# Patient Record
Sex: MALE | Race: WHITE | NOT HISPANIC OR LATINO | Employment: FULL TIME | URBAN - METROPOLITAN AREA
[De-identification: names, ages, dates, MRNs, and addresses within clinical notes are randomized per-mention and may not be internally consistent; named-entity substitution may affect disease eponyms.]

---

## 2017-03-09 ENCOUNTER — TELEPHONE (OUTPATIENT)
Dept: NURSING | Facility: CLINIC | Age: 47
End: 2017-03-09

## 2017-03-09 NOTE — TELEPHONE ENCOUNTER
"Call Type: Triage Call    Presenting Problem: \"I need a doctor who can treat neuropathy.  I  can't stand it any more.\"  I can't even walk on my feet correctly  anymore.  I did see a podiatrist at one point but he wasn't helpful.  As he described his pain it also sounded like it could be vascular  in nature. He describes this as going on for 3 months.  He states he  has large vercose veins as well.  Together we decided he should  start with Internal Medicine for an evaluation so  if referral is  indicated he can get to the right specialist the first time.  He  agreed. Transferred to the schedulers for an appt.  Triage Note:  Guideline Title: Weakness or Paralysis ; Weakness or Paralysis  Recommended Disposition: See Provider within 72 Hours  Original Inclination: Wanted to speak with a nurse  Override Disposition:  Intended Action: See Dr/Make Appt  Physician Contacted: No  New or worsening numbness, weakness (dropping objects) or tingling of the  hand/fingers aggravated by repetitive use and relieved by shaking hand AND not  previously evaluated or not improving after 7 days of treatment ?  YES  Severe breathing problems ? NO  New or worsening signs and symptoms that may indicate shock ? NO  Choking sensation, cannot swallow own saliva with associated drooling and soft  muffled voice ? NO  Generalized weakness or paralysis after snake bite ? NO  Numbness in the groin and saddle area of pelvis AND lower extremity weakness OR  change in bowel or bladder control (loss of control, retention, overflow) ? NO  New difficulty swallowing ? NO  Chest discomfort associated with shortness of breath, sweating, odd heartbeats or  different heart rate, nausea, vomiting, lightheadedness, or fainting lasting 5 or  more minutes now or within the last hour ? NO  Chest pain spreading to the shoulders, neck, jaw, in one or both arms, stomach or  back lasting 5 or more minutes now or within the last hour. Pain is NOT  associated with " taking a deep breath or a productive cough, movement, or touch to  a localized area. ? NO  New muscle weakness after exposure to hot environment ? NO  New weakness AND new onset irregular or rapid more than 120 beats/minute heartbeat  ? NO  Muscle weakness that begins in feet or lower legs and continues to spread up the  body ? NO  Pressure, fullness, squeezing sensation or pain anywhere in the chest lasting 5 or  more minutes now or within the last hour. Pain is NOT associated with taking a  deep breath or a productive cough, movement, or touch to a localized area on the  chest. ? NO  New paralysis (unable to move) or weakness (not due to pain) involving both sides  of body below a specific level ? NO  Symptoms began after starting or changing dose of prescription, nonprescription,  alternative medication, or illicit drug within last 7 days ? NO  Traveled out of country in past 2 months and new onset of unexplained symptom(s) ?  NO  New numbness, weakness or paralysis involving face, arm or leg, especially on same  side of body, loss of balance or coordination, confusion or trouble speaking  occurring now or within last 8 hours ? NO  Experienced transient ischemic symptoms occurring more than 8 hours ago, but  within 24 hours, now resolved ? NO  New onset of severe generalized weakness developing over a few days or less ? NO  New onset or sudden worsening of altered mental status now or within last 8 hours  ? NO  New or sudden worsening difficulty speaking or swallowing occurring now or within  last 8 hours ? NO  New loss of balance or coordination (purposeful action) causing sudden trouble  walking or sitting upright occurring now or within last 8 hours ? NO  Physician Instructions:  Care Advice: Protect the patient from falling or other harm.  Another adult should drive.  Avoid repetitive movements or static body positions that worsen symptoms.  Call provider if symptoms worsen or new symptoms develop.  Follow  provider's prior recommendations for similar pain  if no relief, call provider.  Remove any rings on the fingers of the affected hand, if possible.  Tell provider if concerned about loss of muscle mass or decreased strength.  Symptom Relief:   - Take frequent breaks from activities that seem to cause  symptoms.  - Modify the wrist position when doing repetitive movements so  that the wrist is straight rather than bent down or extended back.  -  Stretch and bend the wrist during a break from activity. Reduce force of  movements  relax your .  - Maintain good posture.  - Use ergonomic tools and  keyboards.  - Reduce wrist bending.  - Keep the hands warm by warming up  the room or wearing fingerless gloves.  - Talk with a provider about  possible exercises to help strength muscles of hands and wrists.  Analgesic/Antipyretic Advice - NSAIDs: Consider aspirin, ibuprofen,  naproxen or ketoprofen for pain or fever as directed on label or by  pharmacist/provider. PRECAUTIONS: - You should not take this medicine for  more than 10 days unless recommended by your provider. EXCEPTIONS: - Should  not be used if taking blood thinners or have bleeding problems. - Do not  use if have history of sensitivity/allergy to any of these medications  or history of cardiovascular, ulcer, kidney, liver disease or diabetes  unless approved by provider. - Do not exceed recommended dose or frequency.  Analgesic/Antipyretic Advice - Acetaminophen: Consider acetaminophen as  directed on label or by pharmacist/provider for pain or fever. PRECAUTIONS:  - Use if there is no history of liver disease, alcoholism, or intake of  three or more alcohol drinks per day - Only if approved by provider during  pregnancy or when breastfeeding - Do not exceed recommended dose or  frequency. Do not take more than 3000 milligrams (mg) in 24 hours. Do not  take this medicine for more than 10 days unless recommended by your  provider. - During pregnancy,  acetaminophen should not be taken more than 3  consecutive days without telling provider - To make sure you don't take too  much, check other medicines you take to see if they also contain  acetaminophen.

## 2017-03-10 ENCOUNTER — OFFICE VISIT (OUTPATIENT)
Dept: FAMILY MEDICINE | Facility: CLINIC | Age: 47
End: 2017-03-10
Payer: COMMERCIAL

## 2017-03-10 VITALS
HEART RATE: 64 BPM | WEIGHT: 183 LBS | BODY MASS INDEX: 26.2 KG/M2 | TEMPERATURE: 98.7 F | DIASTOLIC BLOOD PRESSURE: 92 MMHG | SYSTOLIC BLOOD PRESSURE: 149 MMHG | HEIGHT: 70 IN

## 2017-03-10 DIAGNOSIS — G62.9 PERIPHERAL POLYNEUROPATHY: Primary | ICD-10-CM

## 2017-03-10 LAB
ALBUMIN SERPL-MCNC: 3.6 G/DL (ref 3.4–5)
ALP SERPL-CCNC: 73 U/L (ref 40–150)
ALT SERPL W P-5'-P-CCNC: 47 U/L (ref 0–70)
ANION GAP SERPL CALCULATED.3IONS-SCNC: 4 MMOL/L (ref 3–14)
AST SERPL W P-5'-P-CCNC: 42 U/L (ref 0–45)
BILIRUB SERPL-MCNC: 0.2 MG/DL (ref 0.2–1.3)
BUN SERPL-MCNC: 5 MG/DL (ref 7–30)
CALCIUM SERPL-MCNC: 7.6 MG/DL (ref 8.5–10.1)
CHLORIDE SERPL-SCNC: 107 MMOL/L (ref 94–109)
CO2 SERPL-SCNC: 30 MMOL/L (ref 20–32)
CREAT SERPL-MCNC: 0.73 MG/DL (ref 0.66–1.25)
CRP SERPL-MCNC: <2.9 MG/L (ref 0–8)
ERYTHROCYTE [DISTWIDTH] IN BLOOD BY AUTOMATED COUNT: 11.9 % (ref 10–15)
ERYTHROCYTE [SEDIMENTATION RATE] IN BLOOD BY WESTERGREN METHOD: 5 MM/H (ref 0–15)
GFR SERPL CREATININE-BSD FRML MDRD: ABNORMAL ML/MIN/1.7M2
GLUCOSE SERPL-MCNC: 82 MG/DL (ref 70–99)
HBA1C MFR BLD: 5.3 % (ref 4.3–6)
HCT VFR BLD AUTO: 39 % (ref 40–53)
HGB BLD-MCNC: 13.6 G/DL (ref 13.3–17.7)
MCH RBC QN AUTO: 33.3 PG (ref 26.5–33)
MCHC RBC AUTO-ENTMCNC: 34.9 G/DL (ref 31.5–36.5)
MCV RBC AUTO: 96 FL (ref 78–100)
PLATELET # BLD AUTO: 250 10E9/L (ref 150–450)
POTASSIUM SERPL-SCNC: 3.4 MMOL/L (ref 3.4–5.3)
PROT SERPL-MCNC: 6.6 G/DL (ref 6.8–8.8)
RBC # BLD AUTO: 4.08 10E12/L (ref 4.4–5.9)
SODIUM SERPL-SCNC: 141 MMOL/L (ref 133–144)
TSH SERPL DL<=0.005 MIU/L-ACNC: 0.61 MU/L (ref 0.4–4)
VIT B12 SERPL-MCNC: 446 PG/ML (ref 193–986)
WBC # BLD AUTO: 9 10E9/L (ref 4–11)

## 2017-03-10 PROCEDURE — 82607 VITAMIN B-12: CPT | Performed by: INTERNAL MEDICINE

## 2017-03-10 PROCEDURE — 99214 OFFICE O/P EST MOD 30 MIN: CPT | Performed by: INTERNAL MEDICINE

## 2017-03-10 PROCEDURE — 83036 HEMOGLOBIN GLYCOSYLATED A1C: CPT | Performed by: INTERNAL MEDICINE

## 2017-03-10 PROCEDURE — 99000 SPECIMEN HANDLING OFFICE-LAB: CPT | Performed by: INTERNAL MEDICINE

## 2017-03-10 PROCEDURE — 84443 ASSAY THYROID STIM HORMONE: CPT | Performed by: INTERNAL MEDICINE

## 2017-03-10 PROCEDURE — 85652 RBC SED RATE AUTOMATED: CPT | Performed by: INTERNAL MEDICINE

## 2017-03-10 PROCEDURE — 86140 C-REACTIVE PROTEIN: CPT | Performed by: INTERNAL MEDICINE

## 2017-03-10 PROCEDURE — 36415 COLL VENOUS BLD VENIPUNCTURE: CPT | Performed by: INTERNAL MEDICINE

## 2017-03-10 PROCEDURE — 84207 ASSAY OF VITAMIN B-6: CPT | Mod: 90 | Performed by: INTERNAL MEDICINE

## 2017-03-10 PROCEDURE — 85027 COMPLETE CBC AUTOMATED: CPT | Performed by: INTERNAL MEDICINE

## 2017-03-10 PROCEDURE — 84425 ASSAY OF VITAMIN B-1: CPT | Mod: 90 | Performed by: INTERNAL MEDICINE

## 2017-03-10 PROCEDURE — 80053 COMPREHEN METABOLIC PANEL: CPT | Performed by: INTERNAL MEDICINE

## 2017-03-10 RX ORDER — GABAPENTIN 300 MG/1
300 CAPSULE ORAL 3 TIMES DAILY
Qty: 90 CAPSULE | Refills: 3 | Status: SHIPPED | OUTPATIENT
Start: 2017-03-10 | End: 2017-06-23

## 2017-03-10 NOTE — PATIENT INSTRUCTIONS
We will check some labs and try the gabapentin for nerve pain.  If labs are normal, we may consider a referral to neurology.  If the gabapentin doesn't work, let us know.

## 2017-03-10 NOTE — PROGRESS NOTES
SUBJECTIVE:                                                    Santino Adame is a 46 year old male who presents to clinic today for the following health issues:      Joint Pain     Onset: 3 years    Description:   Location: bilateral feet  Character: Sharp, Dull ache and Cramping    Intensity: moderate, severe    Progression of Symptoms: worse    Accompanying Signs & Symptoms:  Other symptoms: numbness and tingling   History:   Previous similar pain: no       Precipitating factors:   Trauma or overuse: YES- possibly stands a lot with work    Alleviating factors:  Improved by: rest/inactivity and aspirin       Therapies Tried and outcome: aspirin helps temporarily      Santino presents with 3 years of worsening bilateral foot pain. He says that this pain is located in toes and in the bottoms of both feet. Pain is present constantly. He describes the pain as burning/aching/numbness. He says the numbness feels like his toes are elevated an inch above the ground even when he is touching the ground. He notes that his feet do get red on the bottom. He does not having cramping with walking. The pain gets worse with long periods of standing. He does note that the veins in his feet are rather prominent. He has been feeling rather fatigued. He has had a history of abdominal cramps and diarrhea for the past years. He states that he did see a podiatrist at the onset of this pain and was diagnosed with metatarsalgia. He does not have any history of diabetes, thyroid problems, HIV. He drinks about 2 beers per day.    Problem list and histories reviewed & adjusted, as indicated.  Additional history: as documented    Current Outpatient Prescriptions   Medication Sig Dispense Refill     gabapentin (NEURONTIN) 300 MG capsule Take 1 capsule (300 mg) by mouth 3 times daily 90 capsule 3     doxycycline (VIBRAMYCIN) 100 MG capsule Take 1 capsule (100 mg) by mouth 2 times daily (with meals) 120 capsule 1     multivitamin, therapeutic with  "minerals (MULTI-VITAMIN) TABS Take 1 tablet by mouth daily Reported on 3/10/2017 100 tablet 3     omeprazole (PRILOSEC) 20 MG capsule Take 20 mg by mouth daily Reported on 3/10/2017       predniSONE (DELTASONE) 10 MG tablet Take 30 mg daily for 5 days, 20 mg daily for 5 days, then 10 mg daily for 5 days. (Patient not taking: Reported on 3/10/2017) 30 tablet 5     dexamethasone (DECADRON) 1 MG/ML solution Swish approximately 5 minutes and Spit one tsp of decadron two to three times daily. (Patient not taking: Reported on 3/10/2017) 100 mL 1     cholecalciferol 03271 UNITS capsule Take 1 capsule by mouth once a week (Patient not taking: Reported on 3/10/2017) 8 capsule 0     indomethacin (INDOCIN) 25 MG capsule Take 1 capsule (25 mg) by mouth 3 times daily (with meals) (Patient not taking: Reported on 3/10/2017) 42 capsule 1     calcium carbonate-vitamin D (CALTRATE 600+D) 600-400 MG-UNIT CHEW Take 1 chew tab by mouth 2 times daily (Patient not taking: Reported on 3/10/2017) 180 tablet 3     Allergies   Allergen Reactions     Amoxicillin        Reviewed and updated as needed this visit by clinical staff  Tobacco  Allergies  Meds  Med Hx  Surg Hx  Fam Hx  Soc Hx      Reviewed and updated as needed this visit by Provider         ROS:  Constitutional, MSK systems are negative, except as otherwise noted.    OBJECTIVE:                                                    BP (!) 149/92 (BP Location: Right arm, Cuff Size: Adult Large)  Pulse 64  Temp 98.7  F (37.1  C) (Tympanic)  Ht 5' 10\" (1.778 m)  Wt 183 lb (83 kg)  BMI 26.26 kg/m2  Body mass index is 26.26 kg/(m^2).  GENERAL: healthy, alert and no distress  RESP: lungs clear to auscultation - no rales, rhonchi or wheezes  CV: regular rate and rhythm, normal S1 S2, no S3 or S4, no murmur, click or rub, no peripheral edema   Foot exam: Both feet are reddened especially the toes, he does have prominent veins in both feet. PT pulses are somewhat reduced but DP pulses are " strong. He has decreased sensation on the ball of both feet with monofilament exam    Diagnostic Test Results:  Results for orders placed or performed in visit on 03/10/17 (from the past 24 hour(s))   TSH with free T4 reflex   Result Value Ref Range    TSH 0.61 0.40 - 4.00 mU/L   Hemoglobin A1c   Result Value Ref Range    Hemoglobin A1C 5.3 4.3 - 6.0 %   Comprehensive metabolic panel   Result Value Ref Range    Sodium 141 133 - 144 mmol/L    Potassium 3.4 3.4 - 5.3 mmol/L    Chloride 107 94 - 109 mmol/L    Carbon Dioxide 30 20 - 32 mmol/L    Anion Gap 4 3 - 14 mmol/L    Glucose 82 70 - 99 mg/dL    Urea Nitrogen 5 (L) 7 - 30 mg/dL    Creatinine 0.73 0.66 - 1.25 mg/dL    GFR Estimate >90  Non  GFR Calc   >60 mL/min/1.7m2    GFR Estimate If Black >90   GFR Calc   >60 mL/min/1.7m2    Calcium 7.6 (L) 8.5 - 10.1 mg/dL    Bilirubin Total 0.2 0.2 - 1.3 mg/dL    Albumin 3.6 3.4 - 5.0 g/dL    Protein Total 6.6 (L) 6.8 - 8.8 g/dL    Alkaline Phosphatase 73 40 - 150 U/L    ALT 47 0 - 70 U/L    AST 42 0 - 45 U/L   Erythrocyte sedimentation rate auto   Result Value Ref Range    Sed Rate 5 0 - 15 mm/h   CRP inflammation   Result Value Ref Range    CRP Inflammation <2.9 0.0 - 8.0 mg/L   CBC with platelets   Result Value Ref Range    WBC 9.0 4.0 - 11.0 10e9/L    RBC Count 4.08 (L) 4.4 - 5.9 10e12/L    Hemoglobin 13.6 13.3 - 17.7 g/dL    Hematocrit 39.0 (L) 40.0 - 53.0 %    MCV 96 78 - 100 fl    MCH 33.3 (H) 26.5 - 33.0 pg    MCHC 34.9 31.5 - 36.5 g/dL    RDW 11.9 10.0 - 15.0 %    Platelet Count 250 150 - 450 10e9/L        ASSESSMENT/PLAN:                                                        1. Peripheral polyneuropathy (H)    Symptoms sound consistent with peripheral neuropathy. He does not describe any claudication that would make me concerned about a vascular cause of his problems. We'll do workup as listed below for causes of neuropathy- labs so far are normal. He does drink 2 beers per day, I  feel like less than socially uses higher than he is reporting that this would not contribute to neuropathy. If labs are normal, consider nerve conduction study and/or referral to neurology. We're going to try some gabapentin and see if that improves his pain. If no improvement consider Lyrica.    - TSH with free T4 reflex  - Hemoglobin A1c  - Vitamin B12  - Vitamin B1 whole blood  - Vitamin B6  - Comprehensive metabolic panel  - Erythrocyte sedimentation rate auto  - CRP inflammation  - gabapentin (NEURONTIN) 300 MG capsule; Take 1 capsule (300 mg) by mouth 3 times daily  Dispense: 90 capsule; Refill: 3  - CBC with platelets      Naren Sullivan MD  [facility]

## 2017-03-10 NOTE — NURSING NOTE
"Chief Complaint   Patient presents with     Foot Problems     bilateral       Initial BP (!) 149/92 (BP Location: Right arm, Cuff Size: Adult Large)  Pulse 64  Temp 98.7  F (37.1  C) (Tympanic)  Ht 5' 10\" (1.778 m)  Wt 183 lb (83 kg)  BMI 26.26 kg/m2 Estimated body mass index is 26.26 kg/(m^2) as calculated from the following:    Height as of this encounter: 5' 10\" (1.778 m).    Weight as of this encounter: 183 lb (83 kg).  Medication Reconciliation: complete   Marya Jaimes / Certified Medical Assistant......3/10/2017 12:45 PM    "

## 2017-03-10 NOTE — MR AVS SNAPSHOT
"              After Visit Summary   3/10/2017    Santino Adame    MRN: [id number]           Patient Information     Date Of Birth          1970        Visit Information        Provider Department      3/10/2017 12:40 PM Naren Sullivan MD [facility]        Today's Diagnoses     Peripheral polyneuropathy (H)    -  1      Care Instructions    We will check some labs and try the gabapentin for nerve pain.  If labs are normal, we may consider a referral to neurology.  If the gabapentin doesn't work, let us know.        Follow-ups after your visit        Who to contact     If you have questions or need follow up information about today's clinic visit or your schedule please contact [facility] directly at [phone].  Normal or non-critical lab and imaging results will be communicated to you by MyChart, letter or phone within 4 business days after the clinic has received the results. If you do not hear from us within 7 days, please contact the clinic through [employer] or phone. If you have a critical or abnormal lab result, we will notify you by phone as soon as possible.  Submit refill requests through [employer] or call your pharmacy and they will forward the refill request to us. Please allow 3 business days for your refill to be completed.          Additional Information About Your Visit        [employer] Information     [employer] lets you send messages to your doctor, view your test results, renew your prescriptions, schedule appointments and more. To sign up, go to [email or web address] . Click on \"Log in\" on the left side of the screen, which will take you to the Welcome page. Then click on \"Sign up Now\" on the right side of the page.     You will be asked to enter the access code listed below, as well as some personal information. Please follow the directions to create your username and password.     Your access code is: [email or web address]  Expires: 6/8/2017  1:12 PM     Your access code will " " in 90 days. If you need help or a new code, please call your [facility] clinic or [phone].        Care EveryWhere ID     This is your Care EveryWhere ID. This could be used by other organizations to access your [facility] medical records  [id number]        Your Vitals Were     Pulse Temperature Height BMI (Body Mass Index)          64 98.7  F (37.1  C) (Tympanic) 5' 10\" (1.778 m) 26.26 kg/m2         Blood Pressure from Last 3 Encounters:   03/10/17 (!) 149/92   14 157/87   14 132/71    Weight from Last 3 Encounters:   03/10/17 183 lb (83 kg)   14 180 lb (81.6 kg)   14 185 lb (83.9 kg)              We Performed the Following     Basic metabolic panel     Comprehensive metabolic panel     CRP inflammation     Erythrocyte sedimentation rate auto     Hemoglobin A1c     TSH with free T4 reflex     Vitamin B1 whole blood     Vitamin B12     Vitamin B6          Today's Medication Changes          These changes are accurate as of: 3/10/17  1:12 PM.  If you have any questions, ask your nurse or doctor.               Start taking these medicines.        Dose/Directions    gabapentin 300 MG capsule   Commonly known as:  NEURONTIN   Used for:  Peripheral polyneuropathy (H)   Started by:  Naren Sullivan MD        Dose:  300 mg   Take 1 capsule (300 mg) by mouth 3 times daily   Quantity:  90 capsule   Refills:  3            Where to get your medicines      These medications were sent to [facility] [place], [place] - [address]  [address] [place] [address]     Phone:  [phone]     gabapentin 300 MG capsule                Primary Care Provider Office Phone # Fax #    Lizzy Mcqueen [phone]       [facility] [address] [address]        Thank you!     Thank you for choosing [facility]  for your care. Our goal is always to provide you with excellent care. Hearing back from our patients is one way we " can continue to improve our services. Please take a few minutes to complete the written survey that you may receive in the mail after your visit with us. Thank you!             Your Updated Medication List - Protect others around you: Learn how to safely use, store and throw away your medicines at www.disposemymeds.org.          This list is accurate as of: 3/10/17  1:12 PM.  Always use your most recent med list.                   Brand Name Dispense Instructions for use    calcium carbonate-vitamin D 600-400 MG-UNIT Chew    CALTRATE 600+D    180 tablet    Take 1 chew tab by mouth 2 times daily       cholecalciferol 14016 UNITS capsule    VITAMIN D3    8 capsule    Take 1 capsule by mouth once a week       dexamethasone 1 MG/ML (HIGH CONC) solution    DECADRON    100 mL    Swish approximately 5 minutes and Spit one tsp of decadron two to three times daily.       doxycycline 100 MG capsule    VIBRAMYCIN    120 capsule    Take 1 capsule (100 mg) by mouth 2 times daily (with meals)       gabapentin 300 MG capsule    NEURONTIN    90 capsule    Take 1 capsule (300 mg) by mouth 3 times daily       indomethacin 25 MG capsule    INDOCIN    42 capsule    Take 1 capsule (25 mg) by mouth 3 times daily (with meals)       Multi-vitamin Tabs tablet     100 tablet    Take 1 tablet by mouth daily Reported on 3/10/2017       predniSONE 10 MG tablet    DELTASONE    30 tablet    Take 30 mg daily for 5 days, 20 mg daily for 5 days, then 10 mg daily for 5 days.       priLOSEC 20 MG CR capsule   Generic drug:  omeprazole      Take 20 mg by mouth daily Reported on 3/10/2017

## 2017-03-12 LAB — VIT B6 SERPL-MCNC: 30 NG/ML

## 2017-03-13 LAB — VIT B1 BLD-MCNC: 40 UG/DL

## 2017-06-23 ENCOUNTER — OFFICE VISIT (OUTPATIENT)
Dept: FAMILY MEDICINE | Facility: CLINIC | Age: 47
End: 2017-06-23
Payer: COMMERCIAL

## 2017-06-23 VITALS
BODY MASS INDEX: 26.34 KG/M2 | TEMPERATURE: 98.2 F | SYSTOLIC BLOOD PRESSURE: 129 MMHG | WEIGHT: 184 LBS | OXYGEN SATURATION: 99 % | DIASTOLIC BLOOD PRESSURE: 93 MMHG | HEART RATE: 76 BPM | HEIGHT: 70 IN

## 2017-06-23 DIAGNOSIS — G62.9 PERIPHERAL POLYNEUROPATHY: Primary | ICD-10-CM

## 2017-06-23 DIAGNOSIS — M1A.0720 CHRONIC IDIOPATHIC GOUT INVOLVING TOE OF LEFT FOOT WITHOUT TOPHUS: ICD-10-CM

## 2017-06-23 DIAGNOSIS — I10 BENIGN ESSENTIAL HYPERTENSION: ICD-10-CM

## 2017-06-23 PROCEDURE — 99214 OFFICE O/P EST MOD 30 MIN: CPT | Performed by: INTERNAL MEDICINE

## 2017-06-23 RX ORDER — LISINOPRIL 10 MG/1
10 TABLET ORAL DAILY
Qty: 30 TABLET | Refills: 3 | Status: SHIPPED | OUTPATIENT
Start: 2017-06-23 | End: 2018-05-18

## 2017-06-23 RX ORDER — ALLOPURINOL 100 MG/1
TABLET ORAL
Qty: 90 TABLET | Refills: 0 | Status: SHIPPED | OUTPATIENT
Start: 2017-06-23 | End: 2017-12-27

## 2017-06-23 RX ORDER — ALLOPURINOL 300 MG/1
300 TABLET ORAL DAILY
Qty: 30 TABLET | Refills: 11 | Status: SHIPPED | OUTPATIENT
Start: 2017-06-23 | End: 2017-12-27

## 2017-06-23 RX ORDER — GABAPENTIN 400 MG/1
CAPSULE ORAL
Qty: 120 CAPSULE | Refills: 11 | Status: SHIPPED | OUTPATIENT
Start: 2017-06-23 | End: 2017-09-01

## 2017-06-23 NOTE — NURSING NOTE
"Chief Complaint   Patient presents with     NEUROPATHY     few years     Arthritis     x 7 years, last episode about 1.5 weeks ago       Initial BP (!) 138/94 (BP Location: Right arm, Patient Position: Chair, Cuff Size: Adult Regular)  Pulse 76  Temp 98.2  F (36.8  C) (Tympanic)  Ht 5' 10\" (1.778 m)  Wt 184 lb (83.5 kg)  SpO2 99%  BMI 26.4 kg/m2 Estimated body mass index is 26.4 kg/(m^2) as calculated from the following:    Height as of this encounter: 5' 10\" (1.778 m).    Weight as of this encounter: 184 lb (83.5 kg).  Medication Reconciliation: complete   Yanira LITTLE CMA (AAMA)    "

## 2017-06-23 NOTE — PATIENT INSTRUCTIONS
Return in 2 weeks after starting the lisinopril for blood pressure for a free nurse blood pressure check and have your blood work drawn at the same time.  We will also check the B1 level at that point.    Start the allopurinol to help prevent gout.        Controlling High Blood Pressure  High blood pressure (hypertension) is often called the silent killer. This is because many people who have it don t know it. High blood pressure is defined as 140/90 mm Hg or higher. Know your blood pressure and remember to check it regularly. Doing so can save your life. Here are some things you can do to help control your blood pressure.    Choose heart-healthy foods    Select low-salt, low-fat foods. Limit sodium intake to 2,400 mg per day or the amount suggested by your healthcare provider.    Limit canned, dried, cured, packaged, and fast foods. These can contain a lot of salt.    Eat 8 to 10 servings of fruits and vegetables every day.    Choose lean meats, fish, or chicken.    Eat whole-grain pasta, brown rice, and beans.    Eat 2 to 3 servings of low-fat or fat-free dairy products.    Ask your doctor about the DASH eating plan. This plan helps reduce blood pressure.    When you go to a restaurant, ask that your meal be prepared with no added salt.  Maintain a healthy weight    Ask your healthcare provider how many calories to eat a day. Then stick to that number.    Ask your healthcare provider what weight range is healthiest for you. If you are overweight, a weight loss of only 3% to 5% of your body weight can help lower blood pressure. Generally, a good weight loss goal is to lose 10% of your body weight in a year.    Limit snacks and sweets.    Get regular exercise.  Get up and get active    Choose activities you enjoy. Find ones you can do with friends or family. This includes bicycling, dancing, walking, and jogging.    Park farther away from building entrances.    Use stairs instead of the elevator.    When you can,  walk or bike instead of driving.    [place] leaves, garden, or do household repairs.    Be active at a moderate to vigorous level of physical activity for at least 40 minutes for a minimum of 3 to 4 days a week.   Manage stress    Make time to relax and enjoy life. Find time to laugh.    Communicate your concerns with your loved ones and your healthcare provider.    Visit with family and friends, and keep up with hobbies.  Limit alcohol and quit smoking    Men should have no more than 2 drinks per day.    Women should have no more than 1 drink per day.    Talk with your healthcare provider about quitting smoking. Smoking significantly increases your risk for heart disease and stroke. Ask your healthcare provider about community smoking cessation programs and other options.  Medicines  If lifestyle changes aren t enough, your healthcare provider may prescribe high blood pressure medicine. Take all medicines as prescribed. If you have any questions about your medicines, ask your healthcare provider before stopping or changing them.   Date Last Reviewed: 4/27/2016 2000-2017 [employer]. [address] [place] [address], [place] [address]. All rights reserved. This information is not intended as a substitute for professional medical care. Always follow your healthcare professional's instructions.

## 2017-06-23 NOTE — MR AVS SNAPSHOT
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  We will also check the B1 level at that point.    Start the allopurinol to help prevent gout.        Controlling High Blood Pressure  High blood pressure (hypertension) is often called the silent killer. This is because many people who have it don t know it. High blood pressure is defined as 140/90 mm Hg or higher. Know your blood pressure and remember to check it regularly. Doing so can save your life. Here are some things you can do to help control your blood pressure.    Choose heart-healthy foods    Select low-salt, low-fat foods. Limit sodium intake to 2,400 mg per day or the amount suggested by your healthcare provider.    Limit canned, dried, cured, packaged, and fast foods. These can contain a lot of salt.    Eat 8 to 10 servings of fruits and vegetables every day.    Choose lean meats, fish, or chicken.    Eat whole-grain pasta, brown rice, and beans.    Eat 2 to 3 servings of low-fat or fat-free dairy products.    Ask your doctor about the DASH eating plan. This plan helps reduce blood pressure.    When you go to a restaurant, ask that your meal be prepared with no added salt.  Maintain a healthy weight    Ask your healthcare provider how many calories to eat a day. Then stick to that number.    Ask your healthcare provider what weight range is healthiest for you. If you are overweight, a  weight loss of only 3% to 5% of your body weight can help lower blood pressure. Generally, a good weight loss goal is to lose 10% of your body weight in a year.    Limit snacks and sweets.    Get regular exercise.  Get up and get active    Choose activities you enjoy. Find ones you can do with friends or family. This includes bicycling, dancing, walking, and jogging.    Park farther away from building entrances.    Use stairs instead of the elevator.    When you can, walk or bike instead of driving.    [place] leaves, garden, or do household repairs.    Be active at a moderate to vigorous level of physical activity for at least 40 minutes for a minimum of 3 to 4 days a week.   Manage stress    Make time to relax and enjoy life. Find time to laugh.    Communicate your concerns with your loved ones and your healthcare provider.    Visit with family and friends, and keep up with hobbies.  Limit alcohol and quit smoking    Men should have no more than 2 drinks per day.    Women should have no more than 1 drink per day.    Talk with your healthcare provider about quitting smoking. Smoking significantly increases your risk for heart disease and stroke. Ask your healthcare provider about community smoking cessation programs and other options.  Medicines  If lifestyle changes aren t enough, your healthcare provider may prescribe high blood pressure medicine. Take all medicines as prescribed. If you have any questions about your medicines, ask your healthcare provider before stopping or changing them.   Date Last Reviewed: 4/27/2016 2000-2017 [employer]. [address] [place] [address], [place] [address]. All rights reserved. This information is not intended as a substitute for professional medical care. Always follow your healthcare professional's instructions.                Follow-ups after your visit        Future tests that were ordered for you today     Open Future Orders        Priority Expected Expires Ordered  "   **Basic metabolic panel FUTURE 14d Routine 2017    Vitamin B1 whole blood Routine  2018            Who to contact     If you have questions or need follow up information about today's clinic visit or your schedule please contact [facility] directly at [phone].  Normal or non-critical lab and imaging results will be communicated to you by MyChart, letter or phone within 4 business days after the clinic has received the results. If you do not hear from us within 7 days, please contact the clinic through [employer] or phone. If you have a critical or abnormal lab result, we will notify you by phone as soon as possible.  Submit refill requests through [employer] or call your pharmacy and they will forward the refill request to us. Please allow 3 business days for your refill to be completed.          Additional Information About Your Visit        [employer] Information     [employer] lets you send messages to your doctor, view your test results, renew your prescriptions, schedule appointments and more. To sign up, go to [email or web address] . Click on \"Log in\" on the left side of the screen, which will take you to the Welcome page. Then click on \"Sign up Now\" on the right side of the page.     You will be asked to enter the access code listed below, as well as some personal information. Please follow the directions to create your username and password.     Your access code is: [email or web address]  Expires: 2017  1:25 PM     Your access code will  in 90 days. If you need help or a new code, please call your [facility] clinic or [phone].        Care EveryWhere ID     This is your Care EveryWhere ID. This could be used by other organizations to access your [facility] medical records  [id number]        Your Vitals Were     Pulse Temperature Height Pulse Oximetry BMI (Body Mass Index)       76 98.2  F (36.8  C) (Tympanic) 5' 10\" (1.778 m) 99% 26.4 kg/m2        Blood " Pressure from Last 3 Encounters:   06/23/17 (!) 129/93   03/10/17 (!) 149/92   04/11/14 157/87    Weight from Last 3 Encounters:   06/23/17 184 lb (83.5 kg)   03/10/17 183 lb (83 kg)   05/09/14 180 lb (81.6 kg)                 Today's Medication Changes          These changes are accurate as of: 6/23/17  1:25 PM.  If you have any questions, ask your nurse or doctor.               Start taking these medicines.        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 Read the directions carefully, and ask your doctor or other care provider to review them with you.      These medicines have changed or have updated prescriptions.        Dose/Directions    gabapentin 400 MG capsule   Commonly known as:  NEURONTIN   This may have changed:    - medication strength  - how much to take  - how to take this  - when to take this  - additional instructions   Used for:  Peripheral polyneuropathy (H)   Changed by:  Naren Sullivan MD        Take 1 capsule 3 to 4 times per day   Quantity:  120 capsule   Refills:  11            Where to get your medicines       These medications were sent to [facility] [place], [place] - [address] [facility]  [address] [address]     Phone:  [phone]     allopurinol 100 MG tablet    allopurinol 300 MG tablet    gabapentin 400 MG capsule    lisinopril 10 MG tablet                Primary Care Provider Office Phone # Fax #    Lizzy Mcqueen [phone]       [facility] [address] [facility] [address]        Equal Access to Services     SCOTT REA : Hadii aad ku hadasho Soomaali, waaxda luqadaha, qaybta kaalmada adeegyada, waxay idiin hayaan adeeg kharash durga . So [facility] [phone].    ATENCIÓN: Si habla español, tiene a cai disposición servicios gratuitos de asistencia lingüística. [facility] [phone].    We comply with applicable federal civil rights laws and [place] laws. We do not discriminate on the basis of race, color, national origin, age, disability sex, sexual orientation or gender identity.            Thank you!     Thank you for choosing [facility]  for your care. Our goal is always to provide you with excellent care. Hearing back from our patients is one way we can continue to improve our services. Please take a few minutes to complete the written survey that you may receive in the mail after your visit with us. Thank you!             Your Updated Medication List - Protect others around you: Learn how to safely use, store and throw away your medicines at www.disposemymeds.org.          This list is accurate as of: 6/23/17  1:25 PM.  Always use your most recent med list.                   Brand Name Dispense Instructions for use Diagnosis    * allopurinol 100 MG tablet    ZYLOPRIM    90 tablet    Take 100mg per day for one week, then 200mg per day for one week, then 300mg per day.    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 tophus       * allopurinol 300 MG tablet    ZYLOPRIM    30 tablet    Take 1 tablet (300 mg) by  mouth daily    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 tophus       calcium carbonate-vitamin D 600-400 MG-UNIT Chew    CALTRATE 600+D    180 tablet    Take 1 chew tab by mouth 2 times daily    Mouth ulcers       cholecalciferol 99934 UNITS capsule    VITAMIN D3    8 capsule    Take 1 capsule by mouth once a week    Unspecified vitamin D deficiency       dexamethasone 1 MG/ML (HIGH CONC) solution    DECADRON    100 mL    Swish approximately 5 minutes and Spit one tsp of decadron two to three times daily.    Aphthae, oral, Oral aphthae, Aphthous ulcer       gabapentin 400 MG capsule    NEURONTIN    120 capsule    Take 1 capsule 3 to 4 times per day    Peripheral polyneuropathy (H)       indomethacin 25 MG capsule    INDOCIN    42 capsule    Take 1 capsule (25 mg) by mouth 3 times daily (with meals)    Gout, unspecified       lisinopril 10 MG tablet    PRINIVIL/ZESTRIL    30 tablet    Take 1 tablet (10 mg) by mouth daily    Benign essential hypertension       Multi-vitamin Tabs tablet     100 tablet    Take 1 tablet by mouth daily Reported on 3/10/2017        priLOSEC 20 MG CR capsule   Generic drug:  omeprazole      Take 20 mg by mouth daily Reported on 3/10/2017        VITAMIN B-1 PO           * Notice:  This list has 2 medication(s) that are the same as other medications prescribed for you. Read the directions carefully, and ask your doctor or other care provider to review them with you.

## 2017-06-23 NOTE — PROGRESS NOTES
SUBJECTIVE:                                                    Santino Adame is a 47 year old male who presents to clinic today for the following health issues:  Chief Complaint   Patient presents with     NEUROPATHY     few years     Arthritis     x 7 years, last episode about 1.5 weeks ago       Concern - Neuropathy   Onset: few years     Description:   Bilateral foot nerve pain, will radiate into ankle a bit.      Intensity: moderate, severe    Progression of Symptoms:  improving and waxing and waning    Accompanying Signs & Symptoms:  None     Previous history of similar problem:   On going    Precipitating factors:   Worsened by: on feet all day     Alleviating factors:  Improved by: sitting down, elevation     Therapies Tried and outcome: gabapentin is helping; however, patient is taking Gabapentin 4 - 5 times daily when at work and wondering if he needs a dosage change.     I saw Santino in March for bilateral foot pain that sounded neuropathic.  His B1 level was a bit low and he does drink a few beers daily.  He has normal TSH, A1C, CMP, ESR, CRP, CBC, B12, and B6.  We started gabapentin, which is helping some but he wonders if dose could be higher.  He is not having any side effects.       Gout  Duration: 7 years, having flares 4-5 times per year   Description   Location: big toe - left  Joint Swelling: YES, swelling to ankle   Redness: YES  Pain intensity:  severe  Accompanying signs and symptoms: very tough to walk   History  Previous history of gout: YES   Trauma to the area: no   Precipitating factors:   Alcohol usage: Moderate  Diuretic use: no   Recent illness: no   Therapies tried and outcome: ibuprofen seems to help, indocin   Patient looking for a preventative medication for gout.     Had recent gout flare last week in the left 1st toe.  He has had it in the left ankle as well and also the right foot.  Would like to start preventative medication.      He is also concerned that BPs have been a bit  high.        Problem list and histories reviewed & adjusted, as indicated.  Additional history: as documented    Current Outpatient Prescriptions   Medication Sig Dispense Refill     Thiamine HCl (VITAMIN B-1 PO)        gabapentin (NEURONTIN) 400 MG capsule Take 1 capsule 3 to 4 times per day 120 capsule 11     allopurinol (ZYLOPRIM) 100 MG tablet Take 100mg per day for one week, then 200mg per day for one week, then 300mg per day. 90 tablet 0     allopurinol (ZYLOPRIM) 300 MG tablet Take 1 tablet (300 mg) by mouth daily 30 tablet 11     lisinopril (PRINIVIL/ZESTRIL) 10 MG tablet Take 1 tablet (10 mg) by mouth daily 30 tablet 3     multivitamin, therapeutic with minerals (MULTI-VITAMIN) TABS Take 1 tablet by mouth daily Reported on 3/10/2017 100 tablet 3     omeprazole (PRILOSEC) 20 MG capsule Take 20 mg by mouth daily Reported on 3/10/2017       [DISCONTINUED] gabapentin (NEURONTIN) 300 MG capsule Take 1 capsule (300 mg) by mouth 3 times daily (Patient taking differently: Take 300 mg by mouth 4 times daily ) 90 capsule 3     dexamethasone (DECADRON) 1 MG/ML solution Swish approximately 5 minutes and Spit one tsp of decadron two to three times daily. (Patient not taking: Reported on 3/10/2017) 100 mL 1     cholecalciferol 28046 UNITS capsule Take 1 capsule by mouth once a week (Patient not taking: Reported on 3/10/2017) 8 capsule 0     indomethacin (INDOCIN) 25 MG capsule Take 1 capsule (25 mg) by mouth 3 times daily (with meals) (Patient not taking: Reported on 3/10/2017) 42 capsule 1     calcium carbonate-vitamin D (CALTRATE 600+D) 600-400 MG-UNIT CHEW Take 1 chew tab by mouth 2 times daily (Patient not taking: Reported on 3/10/2017) 180 tablet 3     Allergies   Allergen Reactions     Amoxicillin        Reviewed and updated as needed this visit by clinical staff  Tobacco  Allergies  Med Hx  Surg Hx  Fam Hx  Soc Hx      Reviewed and updated as needed this visit by Provider         ROS:  Constitutional,  "cardiovascular, MSK, neuro systems are negative, except as otherwise noted.    OBJECTIVE:     BP (!) 129/93 (BP Location: Right arm, Patient Position: Chair, Cuff Size: Adult Regular)  Pulse 76  Temp 98.2  F (36.8  C) (Tympanic)  Ht 5' 10\" (1.778 m)  Wt 184 lb (83.5 kg)  SpO2 99%  BMI 26.4 kg/m2  Body mass index is 26.4 kg/(m^2).  GENERAL: healthy, alert and no distress  MS: some ongoing swelling in ankles    Diagnostic Test Results:  none     ASSESSMENT/PLAN:       1. Peripheral polyneuropathy (H)    Possibly due to low B1 from alcohol use.  Gabapentin is helping though current dose not quite enough.  He is taking B1 supplement intermittently-recommended more consistent use and will recheck level in a couple weeks.  Recommended decreasing alcohol consumption, which may help with neuropathy, gout, and HTN.     - gabapentin (NEURONTIN) 400 MG capsule; Take 1 capsule 3 to 4 times per day  Dispense: 120 capsule; Refill: 11  - Vitamin B1 whole blood; Future    2. 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  Will titrate up to 300mg of allopurinol daily.  Will not recheck uric acid level since these have been normal in past.      - allopurinol (ZYLOPRIM) 100 MG tablet; Take 100mg per day for one week, then 200mg per day for one week, then 300mg per day.  Dispense: 90 tablet; Refill: 0  - allopurinol (ZYLOPRIM) 300 MG tablet; Take 1 tablet (300 mg) by mouth daily  Dispense: 30 tablet; Refill: 11    3. Benign essential hypertension    DBP slightly high though SBP normal.  Discussed lifestyle changes.  He would like to start medication now due to family history, so will initiate lisinopril.  RN BP check and labs in 2 weeks.      - lisinopril (PRINIVIL/ZESTRIL) 10 MG tablet; Take 1 tablet (10 mg) by mouth daily  Dispense: 30 tablet; Refill: 3  - **Basic metabolic panel FUTURE 14d; Future      Naren Sullivan MD  [facility] " [place]

## 2017-07-11 PROBLEM — I10 BENIGN ESSENTIAL HYPERTENSION: Status: ACTIVE | Noted: 2017-07-11

## 2017-07-19 ENCOUNTER — TELEPHONE (OUTPATIENT)
Dept: FAMILY MEDICINE | Facility: CLINIC | Age: 47
End: 2017-07-19

## 2017-07-19 NOTE — LETTER
[facility]  [address] [address]  Phone: [phone]    August 2, 2017      Santino Adame  [address] [address]            Dear Santino Adame:    Your most recent blood pressure reading preformed at our clinic was higher than we like to see it. The goal is to have it under 140/90 in clinic. Please call our clinic [phone] to schedule a blood pressure recheck on our RN schedule. This appointment is free of charge and takes about 15 minutes to complete. Be sure to take all of your blood pressure medications, and avoid stimulants like caffeine, cold medicines, sudafed, or tobacco prior to your recheck.    If your blood pressure medication was changed by your provider recently wait 2 weeks before making this recheck appointment.     Thank you for trusting us with your health care.    Sincerely,    SAUNDRA Paalcio / augustine

## 2017-07-19 NOTE — TELEPHONE ENCOUNTER
Called patient, left message w/ phone number for patient to return call. When patient calls back, please schedule w/ RN for a blood pressure check. Will route to panel management pool and postpone for 1 week. Yanira LITTLE CMA ([facility])

## 2017-08-02 NOTE — TELEPHONE ENCOUNTER
Panel Management Review      Patient has the following on his problem list:     Hypertension   Last three blood pressure readings:  BP Readings from Last 3 Encounters:   06/23/17 (!) 129/93   03/10/17 (!) 149/92   04/11/14 157/87     Blood pressure: FAILED    HTN Guidelines:  Age 18-59 BP range:  Less than 140/90  Age 60-85 with Diabetes:  Less than 140/90  Age 60-85 without Diabetes:  less than 150/90        Composite cancer screening  Chart review shows that this patient is due/due soon for the following None  Summary:    Patient is due/failing the following:   BP CHECK    Action needed:   Patient needs nurse only appointment.    Type of outreach:    Sent letter.    Questions for provider review:    None                                                                                                                                    Rick CROWDER CMA

## 2017-09-01 ENCOUNTER — ALLIED HEALTH/NURSE VISIT (OUTPATIENT)
Dept: FAMILY MEDICINE | Facility: CLINIC | Age: 47
End: 2017-09-01
Payer: COMMERCIAL

## 2017-09-01 ENCOUNTER — TELEPHONE (OUTPATIENT)
Dept: FAMILY MEDICINE | Facility: CLINIC | Age: 47
End: 2017-09-01

## 2017-09-01 VITALS — SYSTOLIC BLOOD PRESSURE: 134 MMHG | HEART RATE: 60 BPM | DIASTOLIC BLOOD PRESSURE: 89 MMHG

## 2017-09-01 DIAGNOSIS — G62.9 PERIPHERAL POLYNEUROPATHY: ICD-10-CM

## 2017-09-01 DIAGNOSIS — Z01.30 BP CHECK: Primary | ICD-10-CM

## 2017-09-01 PROCEDURE — 99207 ZZC NO CHARGE NURSE ONLY: CPT

## 2017-09-01 RX ORDER — GABAPENTIN 400 MG/1
CAPSULE ORAL
Qty: 150 CAPSULE | Refills: 11 | Status: SHIPPED | OUTPATIENT
Start: 2017-09-01 | End: 2018-05-18

## 2017-09-01 NOTE — TELEPHONE ENCOUNTER
Blood pressure is borderline, so okay to monitor or could try another medication.  In previous discussion, he preferred to take medication due to his family history, so if that is still the case, I would next recommending trying losartan.  I can send in an prescription if he would like.    I have updated his gabapentin prescription so that he can take it 5 times per day and the next fill will have more pills.  Thanks.    Naren Sullivan MD

## 2017-09-01 NOTE — TELEPHONE ENCOUNTER
Patient notified of MD's recommendations  He reports he will monitor BP for now and recheck BP in RN clinic in a month  Scheduled Rn Clinic appt for BP check 10/6/17    Routing to provider, for KEYONA CROWDER Rn

## 2017-09-01 NOTE — NURSING NOTE
Patient following up on Panel management for BP check  LOV 17, started Lisinopril at that time  Patient reports that he took Lisinopril as prescribed for 10 days, then stopped due to a nagging cough that started when he started taking Lisinopril  He has not had the nagging cough since discontinuing Lisinopril  He has not been back to complete Labs due to discontinuing Lisinopril    BP's today:  1st readin/91  2nd readin/89    Patient reports that he is taking 5 Gabapentin a day to control his pain, wanted provider to know that the 120 tablets are not holding him until the next refill is due to fill    Please advise    Routing to provider.    Raquel CROWDER Rn

## 2017-09-01 NOTE — MR AVS SNAPSHOT
"              After Visit Summary   2017    Santino Adame    MRN: [id number]           Patient Information     Date Of Birth          1970        Visit Information        Provider Department      2017 1:15 PM COLLETTE OGDINEZ/LINNETTE SALDAÑA [facility]        Today's Diagnoses     BP check    -  1       Follow-ups after your visit        Who to contact     If you have questions or need follow up information about today's clinic visit or your schedule please contact [facility] directly at [phone].  Normal or non-critical lab and imaging results will be communicated to you by MyChart, letter or phone within 4 business days after the clinic has received the results. If you do not hear from us within 7 days, please contact the clinic through [email or web address] or phone. If you have a critical or abnormal lab result, we will notify you by phone as soon as possible.  Submit refill requests through Avuxi or call your pharmacy and they will forward the refill request to us. Please allow 3 business days for your refill to be completed.          Additional Information About Your Visit        MyChart Information     Avuxi lets you send messages to your doctor, view your test results, renew your prescriptions, schedule appointments and more. To sign up, go to [email or web address] . Click on \"Log in\" on the left side of the screen, which will take you to the Welcome page. Then click on \"Sign up Now\" on the right side of the page.     You will be asked to enter the access code listed below, as well as some personal information. Please follow the directions to create your username and password.     Your access code is: [email or web address]  Expires: 2017  1:25 PM     Your access code will  in 90 days. If you need help or a new code, please call your [facility] or [phone].        Care EveryWhere ID     This is your Care EveryWhere ID. This could be used by other organizations to access your " [facility] medical records  [id number]        Your Vitals Were     Pulse                   60            Blood Pressure from Last 3 Encounters:   09/01/17 134/89   06/23/17 (!) 129/93   03/10/17 (!) 149/92    Weight from Last 3 Encounters:   06/23/17 184 lb (83.5 kg)   03/10/17 183 lb (83 kg)   05/09/14 180 lb (81.6 kg)              Today, you had the following     No orders found for display       Primary Care Provider Office Phone # Fax #    Lizzy Caraballozach, NICHOLE [phone] [address] [facility] [address]        Equal Access to Services     SCOTT REA : Hadii osorio avileso Soradha, waaxda luqadaha, qaybta kaalmada adeegyada, alicia thomason. So [facility] [phone].    ATENCIÓN: Si habla español, tiene a cai disposición servicios gratuitos de asistencia lingüística. Llame al [phone].    We comply with applicable federal civil rights laws and [place] laws. We do not discriminate on the basis of race, color, national origin, age, disability sex, sexual orientation or gender identity.            Thank you!     Thank you for choosing [facility]  for your care. Our goal is always to provide you with excellent care. Hearing back from our patients is one way we can continue to improve our services. Please take a few minutes to complete the written survey that you may receive in the mail after your visit with us. Thank you!             Your Updated Medication List - Protect others around you: Learn how to safely use, store and throw away your medicines at www.disposemymeds.org.          This list is accurate as of: 9/1/17  1:32 PM.  Always use your most recent med list.                   Brand Name Dispense Instructions for use Diagnosis    * allopurinol 100 MG tablet    ZYLOPRIM    90 tablet    Take 100mg per day for one week, then 200mg per day for one week, then 300mg per day.    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 tophus        * allopurinol 300 MG tablet    ZYLOPRIM    30 tablet    Take 1 tablet (300 mg) by mouth daily    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 tophus       calcium carbonate-vitamin D 600-400 MG-UNIT Chew    CALTRATE 600+D    180 tablet    Take 1 chew tab by mouth 2 times daily    Mouth ulcers       cholecalciferol 38435 UNITS capsule    VITAMIN D3    8 capsule    Take 1 capsule by mouth once a week    Unspecified vitamin D deficiency       dexamethasone 1 MG/ML (HIGH CONC) solution    DECADRON    100 mL    Swish approximately 5 minutes and Spit one tsp of decadron two to three times daily.    Aphthae, oral, Oral aphthae, Aphthous ulcer       gabapentin 400 MG capsule    NEURONTIN    120 capsule    Take 1 capsule 3 to 4 times per day    Peripheral polyneuropathy (H)       indomethacin 25 MG capsule    INDOCIN    42 capsule    Take 1 capsule (25 mg) by mouth 3 times daily (with meals)    Gout, unspecified       lisinopril 10 MG tablet    PRINIVIL/ZESTRIL    30 tablet    Take 1 tablet (10 mg) by mouth daily    Benign essential hypertension       Multi-vitamin Tabs tablet     100 tablet    Take 1 tablet by mouth daily Reported on 3/10/2017        priLOSEC 20 MG CR capsule   Generic drug:  omeprazole      Take 20 mg by mouth daily Reported on 3/10/2017        VITAMIN B-1 PO           * Notice:  This list has 2 medication(s) that are the same as other medications prescribed for you. Read the directions carefully, and ask your doctor or other care provider to review them with you.

## 2017-12-22 ENCOUNTER — TELEPHONE (OUTPATIENT)
Dept: FAMILY MEDICINE | Facility: CLINIC | Age: 47
End: 2017-12-22

## 2017-12-22 DIAGNOSIS — M1A.0720 CHRONIC IDIOPATHIC GOUT INVOLVING TOE OF LEFT FOOT WITHOUT TOPHUS: ICD-10-CM

## 2017-12-22 NOTE — TELEPHONE ENCOUNTER
Patient states he is having a gout flare.  He is not taking the allopurinol on a daily basis.  Requesting refill of indomethacin - last RX was 11/2013.  His LOV was back in June with Dr. Sullivan.  Rn advised appt.  He states he will go to .    Breanna CROWDER RN

## 2017-12-22 NOTE — TELEPHONE ENCOUNTER
Reason for call:  Patient reporting a symptom    Symptom or request: Gout    Duration (how long have symptoms been present): 2 days    Have you been treated for this before? Yes    Additional comments: Pt was treated with a regiment of medication last April. He was told it would clear gout for a year and he has had twice since. He is looking for a different medication today. He cannot get his boot on.     Phone Number patient can be reached at:  Home number on file [phone] (home)    Best Time:  any      Call taken on 12/22/2017 at 10:18 AM by Lily Menchaca

## 2017-12-26 ENCOUNTER — TELEPHONE (OUTPATIENT)
Dept: PEDIATRICS | Facility: CLINIC | Age: 47
End: 2017-12-26

## 2017-12-26 NOTE — TELEPHONE ENCOUNTER
Patient called with question regarding his gout medications. allopurinol  And indomethacin. He called on 12/22 with concerns about gout.   Currently at work until 430p     Pharmacy: [facility]    Ok to leave a detailed message.     Magdalene OSPINA  Station

## 2017-12-27 ENCOUNTER — TELEPHONE (OUTPATIENT)
Dept: FAMILY MEDICINE | Facility: CLINIC | Age: 47
End: 2017-12-27

## 2017-12-27 DIAGNOSIS — M1A.0720 CHRONIC IDIOPATHIC GOUT INVOLVING TOE OF LEFT FOOT WITHOUT TOPHUS: ICD-10-CM

## 2017-12-27 DIAGNOSIS — M10.9 ACUTE GOUTY ARTHRITIS: Primary | ICD-10-CM

## 2017-12-27 RX ORDER — ALLOPURINOL 300 MG/1
300 TABLET ORAL DAILY
Qty: 30 TABLET | Refills: 5 | Status: SHIPPED | OUTPATIENT
Start: 2017-12-27 | End: 2018-05-31 | Stop reason: ALTCHOICE

## 2017-12-27 RX ORDER — ALLOPURINOL 100 MG/1
TABLET ORAL
Qty: 90 TABLET | Refills: 0 | Status: CANCELLED | OUTPATIENT
Start: 2017-12-27

## 2017-12-27 RX ORDER — INDOMETHACIN 25 MG/1
25 CAPSULE ORAL
Qty: 42 CAPSULE | Refills: 1 | Status: SHIPPED | OUTPATIENT
Start: 2017-12-27 | End: 2018-05-18

## 2017-12-27 NOTE — TELEPHONE ENCOUNTER
Left detailed message per his ok below that he will need to call us at [phone] to schedule an appt.   Blank Fabian RNC

## 2017-12-27 NOTE — TELEPHONE ENCOUNTER
Refills of allopurinol and indomethacin signed.  It sounds like he has not been taking the allopurinol regularly- if this is the case, recommend he wait to resume this until his current gout flare has resolved before he restart it.    Naren Sullivan MD

## 2017-12-27 NOTE — TELEPHONE ENCOUNTER
Reason for call:  Patient reporting a symptom    Symptom or request: Pt called about a gout flare-up 12/22 and was told that pt needed an appt and he states that he cannot afford to pay for a clinic appt, so he is again asking Dr. Sullivan for an Rx for gout - [facility]     Duration (how long have symptoms been present): 1 week    Have you been treated for this before? Yes    Additional comments:     Phone Number patient can be reached at:  Cell number on file:    Telephone Information:   Mobile [phone]       Best Time:  any    Can we leave a detailed message on this number:  YES    Call taken on 12/27/2017 at 12:16 PM by Serina Bernardo

## 2018-04-26 ENCOUNTER — TELEPHONE (OUTPATIENT)
Dept: FAMILY MEDICINE | Facility: CLINIC | Age: 48
End: 2018-04-26

## 2018-04-26 DIAGNOSIS — M10.9 ACUTE GOUT, UNSPECIFIED CAUSE, UNSPECIFIED SITE: Primary | ICD-10-CM

## 2018-04-26 RX ORDER — PREDNISONE 20 MG/1
20 TABLET ORAL 2 TIMES DAILY
Qty: 14 TABLET | Refills: 0 | Status: SHIPPED | OUTPATIENT
Start: 2018-04-26 | End: 2018-05-03

## 2018-04-26 NOTE — TELEPHONE ENCOUNTER
Patient is contacted and told of the prescription of prednisone the need to be seen in clinic and labs. Cierra VOSS RN

## 2018-04-26 NOTE — TELEPHONE ENCOUNTER
Reason for Call:  Other allopurinol not working    Detailed comments: pt calling stating he was started on allopurinol in December. He is having a flare of gout and it doesn't seem to be helping. He is wondering if there is anything beside this that might work better. He may not be able to answer his phone and said it would be ok to let him know if we filled it. He will be in the area tomorrow and could pick it up.    Phone Number Patient can be reached at: Cell number on file:    Telephone Information:   Mobile [phone]       Best Time: any    Can we leave a detailed message on this number? YES    Call taken on 4/26/2018 at 2:43 PM by Jelena Chapa

## 2018-04-26 NOTE — TELEPHONE ENCOUNTER
Allopurinol does not help when gout flares but to prevent frequency of gout flares.  He has not been in the clinic in a while and we have not checked his uric acid levels in some time so it is hard to know if the Allopurinol is dosed appropriately    Prednisone ordered today - but needs to follow up in clinic to do labs and follow up     SAUNDRA Palacio

## 2018-04-26 NOTE — TELEPHONE ENCOUNTER
Lizzy,    Patient called saying the allopurinol is not helping with gout flare.  Looks like he has not been seen in clinic since June.  Office visit or prescribe some prednisone?  Please advise. Cierra VOSS RN

## 2018-05-18 ENCOUNTER — OFFICE VISIT (OUTPATIENT)
Dept: FAMILY MEDICINE | Facility: CLINIC | Age: 48
End: 2018-05-18
Payer: COMMERCIAL

## 2018-05-18 ENCOUNTER — TELEPHONE (OUTPATIENT)
Dept: PEDIATRICS | Facility: CLINIC | Age: 48
End: 2018-05-18

## 2018-05-18 VITALS
TEMPERATURE: 98.7 F | WEIGHT: 188 LBS | HEIGHT: 70 IN | SYSTOLIC BLOOD PRESSURE: 150 MMHG | DIASTOLIC BLOOD PRESSURE: 90 MMHG | BODY MASS INDEX: 26.92 KG/M2 | HEART RATE: 60 BPM

## 2018-05-18 DIAGNOSIS — G62.9 PERIPHERAL POLYNEUROPATHY: ICD-10-CM

## 2018-05-18 DIAGNOSIS — M1A.0720 CHRONIC IDIOPATHIC GOUT INVOLVING TOE OF LEFT FOOT WITHOUT TOPHUS: ICD-10-CM

## 2018-05-18 DIAGNOSIS — I10 BENIGN ESSENTIAL HYPERTENSION: Primary | ICD-10-CM

## 2018-05-18 PROCEDURE — 99214 OFFICE O/P EST MOD 30 MIN: CPT | Performed by: INTERNAL MEDICINE

## 2018-05-18 RX ORDER — GABAPENTIN 400 MG/1
800 CAPSULE ORAL 3 TIMES DAILY
Qty: 180 CAPSULE | Refills: 11 | Status: SHIPPED | OUTPATIENT
Start: 2018-05-18 | End: 2018-06-22

## 2018-05-18 RX ORDER — LISINOPRIL 10 MG/1
10 TABLET ORAL DAILY
Qty: 30 TABLET | Refills: 3 | Status: CANCELLED | OUTPATIENT
Start: 2018-05-18

## 2018-05-18 RX ORDER — FEBUXOSTAT 40 MG/1
40 TABLET, FILM COATED ORAL DAILY
Qty: 90 TABLET | Refills: 3 | Status: SHIPPED | OUTPATIENT
Start: 2018-05-18 | End: 2018-05-18

## 2018-05-18 RX ORDER — LOSARTAN POTASSIUM 25 MG/1
25 TABLET ORAL DAILY
Qty: 90 TABLET | Refills: 1 | Status: SHIPPED | OUTPATIENT
Start: 2018-05-18 | End: 2018-05-31

## 2018-05-18 RX ORDER — PROBENECID 500 MG/1
TABLET, FILM COATED ORAL
Qty: 60 TABLET | Refills: 11 | Status: SHIPPED | OUTPATIENT
Start: 2018-05-18 | End: 2019-05-19

## 2018-05-18 NOTE — PATIENT INSTRUCTIONS
Return in about two weeks after starting your new blood pressure medicine to have labs drawn (electrolytes and kidney function) and for a free nurse BP check.   We'll also check the B1 and uric acid levels at that time.     They should call you to set up the EMG nerve study.

## 2018-05-18 NOTE — PROGRESS NOTES
SUBJECTIVE:   Santino Adame is a 47 year old male who presents to clinic today for the following health issues:      Lower extremities       Duration: ongoing, intermittently    Description (location/character/radiation): right and left legs, knees and feet     Intensity:  Severe 10/10    Accompanying signs and symptoms: leg sweeling and pain in feet and knees      History (similar episodes/previous evaluation): Gout and Neuropathy     Precipitating or alleviating factors: None    Therapies tried and outcome: Gabapentin, Allopurinol seem to not be helping much anymore        Santino reports ongoing burning pain that goes from his feet up to his knees on both sides.  He was taking gabapentin 4-5 times per day, but that was no longer helping so it always increase to taking it 7 times per day, and that still is not completely relieving the pain.  He is also continuing to have gout flares about every 4 months, which has not improved at all since starting allopurinol.  She did have a recent flare in April that was treated with prednisone with improvement.  He does have some ongoing swelling in his legs bilaterally in the ankles.  Currently does not have any gout flare.    Hypertension Follow-up      Outpatient blood pressures are not being checked.    He has not been taking lisinopril since he had a severe cough that developed on it after just a few days, so he discontinued the medication      Problem list and histories reviewed & adjusted, as indicated.  Additional history: as documented    Patient Active Problem List   Diagnosis     Mouth ulcers     CARDIOVASCULAR SCREENING; LDL GOAL LESS THAN 160     Gout     Benign essential hypertension     Past Surgical History:   Procedure Laterality Date     right leg surgery  2007       Social History   Substance Use Topics     Smoking status: Former Smoker     Packs/day: 0.50     Types: Cigarettes, Dip, chew, snus or snuff     Smokeless tobacco: Current User     Types: Chew       "Comment: 1 pack every 3 days     Alcohol use Yes      Comment: daily- a few beers     Family History   Problem Relation Age of Onset     Hypertension Father          Current Outpatient Prescriptions   Medication Sig Dispense Refill     allopurinol (ZYLOPRIM) 300 MG tablet Take 1 tablet (300 mg) by mouth daily 30 tablet 5     febuxostat (ULORIC) 40 MG TABS tablet Take 1 tablet (40 mg) by mouth daily 90 tablet 3     gabapentin (NEURONTIN) 400 MG capsule Take 2 capsules (800 mg) by mouth 3 times daily 180 capsule 11     losartan (COZAAR) 25 MG tablet Take 1 tablet (25 mg) by mouth daily 90 tablet 1     omeprazole (PRILOSEC) 20 MG capsule Take 20 mg by mouth daily Reported on 3/10/2017       Thiamine HCl (VITAMIN B-1 PO)        multivitamin, therapeutic with minerals (MULTI-VITAMIN) TABS Take 1 tablet by mouth daily Reported on 3/10/2017 100 tablet 3     [DISCONTINUED] gabapentin (NEURONTIN) 400 MG capsule Take 1 capsule 4 to 5 times per day 150 capsule 11     Allergies   Allergen Reactions     Amoxicillin      Lisinopril Nausea and Vomiting       Reviewed and updated as needed this visit by clinical staff  Tobacco  Allergies  Med Hx  Surg Hx  Fam Hx  Soc Hx      Reviewed and updated as needed this visit by Provider         ROS:  Constitutional, MSK, cardiovascular systems are negative, except as otherwise noted.    OBJECTIVE:     /90  Pulse 60  Temp 98.7  F (37.1  C) (Tympanic)  Ht 5' 10\" (1.778 m)  Wt 188 lb (85.3 kg)  BMI 26.98 kg/m2  Body mass index is 26.98 kg/(m^2).  GENERAL: healthy, alert and no distress  RESP: lungs clear to auscultation - no rales, rhonchi or wheezes  CV: regular rate and rhythm, normal S1 S2, no S3 or S4, no murmur, click or rub  MS: Moderate pitting ankle edema bilaterally      ASSESSMENT/PLAN:       1. Benign essential hypertension    He stopped lisinopril due to cough, blood pressure is elevated.  We will try losartan instead.  Follow-up in 2 weeks for labs and BP recheck.  " Previous order some labs are still pending.    - losartan (COZAAR) 25 MG tablet; Take 1 tablet (25 mg) by mouth daily  Dispense: 90 tablet; Refill: 1    2. Peripheral polyneuropathy    Symptoms do sound consistent with neuropathy and we did do lab workup for this last year that revealed low B1 level, likely due to alcohol use.  He has been taking of B1 supplement consistently at this point.  He has not had an  EMG done, so I do recommend proceeding with this test since he continues to have worsening symptoms.  He has been taking gabapentin 5-7 times per day.  Recheck B1 level when he returns for labs    - gabapentin (NEURONTIN) 400 MG capsule; Take 2 capsules (800 mg) by mouth 3 times daily  Dispense: 180 capsule; Refill: 11  - ORTHO  REFERRAL    3. 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 tophus    He did not have any improvement frequency of his gout attacks with the allopurinol, so we will try Uloric instead.    - febuxostat (ULORIC) 40 MG TABS tablet; Take 1 tablet (40 mg) by mouth daily  Dispense: 90 tablet; Refill: 3  - Uric acid; Future    ADDENDUM:  Uloric too expensive, will try Probenecid instead.      Naren Sullivan MD  [facility]

## 2018-05-18 NOTE — TELEPHONE ENCOUNTER
Reason for Call:  Other prescription    Detailed comments: Darien calling from the pharmacy stating even with a voucher this medication will cost the pt $250.00. He said the pt is wondering if there is something else he could try.     Phone Number Patient can be reached at: Other phone number:  Darien [phone]    Best Time: day    Can we leave a detailed message on this number? YES    Call taken on 5/18/2018 at 1:47 PM by Jelena Chapa

## 2018-05-18 NOTE — MR AVS SNAPSHOT
After Visit Summary   5/18/2018    Santino Adame    MRN: [id number]           Patient Information     Date Of Birth          1970        Visit Information        Provider Department      5/18/2018 12:40 PM Naren Sullivan MD [facility]        Today's Diagnoses     Benign essential hypertension        Peripheral polyneuropathy        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 tophus          Care Instructions    Return in about two weeks after starting your new blood pressure medicine to have labs drawn (electrolytes and kidney function) and for a free nurse BP check.   We'll also check the B1 and uric acid levels at that time.     They should call you to set up the EMG nerve study.           Follow-ups after your visit        Additional Services     ORTHO  REFERRAL       [facility] is referring you to the Orthopedic  Services at [facility].       The  Representative will assist you in the coordination of your Orthopedic and Musculoskeletal Care as prescribed by your physician.    The  Representative will call you within 1 business day to help schedule your appointment, or you may contact the  Representative at:    All areas ~ [phone]     Type of Referral : EMG       Timeframe requested: Routine    Coverage of these services is subject to the terms and limitations of your health insurance plan.  Please call member services at your health plan with any benefit or coverage questions.      If X-rays, CT or MRI's have been performed, please contact the facility where they were done to arrange for , prior to your scheduled appointment.  Please bring this referral request to your appointment and present it to your specialist.                  Follow-up notes from your care team     Return in about 2 weeks (around 6/1/2018) for BP Recheck, Lab Work.      Future tests that were  "ordered for you today     Open Future Orders        Priority Expected Expires Ordered    Uric acid Routine  2019            Who to contact     If you have questions or need follow up information about today's clinic visit or your schedule please contact [facility] directly at [phone].  Normal or non-critical lab and imaging results will be communicated to you by MyChart, letter or phone within 4 business days after the clinic has received the results. If you do not hear from us within 7 days, please contact the clinic through PÃºbliKohart or phone. If you have a critical or abnormal lab result, we will notify you by phone as soon as possible.  Submit refill requests through Kinn[employer] or call your pharmacy and they will forward the refill request to us. Please allow 3 business days for your refill to be completed.          Additional Information About Your Visit        MyChart Information     [employer] Software lets you send messages to your doctor, view your test results, renew your prescriptions, schedule appointments and more. To sign up, go to [email or web address] . Click on \"Log in\" on the left side of the screen, which will take you to the Welcome page. Then click on \"Sign up Now\" on the right side of the page.     You will be asked to enter the access code listed below, as well as some personal information. Please follow the directions to create your username and password.     Your access code is: [email or web address]  Expires: 2018  1:15 PM     Your access code will  in 90 days. If you need help or a new code, please call your [facility] clinic or [phone].        Care EveryWhere ID     This is your Care EveryWhere ID. This could be used by other organizations to access your [facility] medical records  [id number]        Your Vitals Were     Pulse Temperature Height BMI (Body Mass Index)          60 98.7  F (37.1  C) (Tympanic) 5' 10\" (1.778 m) 26.98 kg/m2         Blood Pressure from " Last 3 Encounters:   05/18/18 150/90   09/01/17 134/89   06/23/17 (!) 129/93    Weight from Last 3 Encounters:   05/18/18 188 lb (85.3 kg)   06/23/17 184 lb (83.5 kg)   03/10/17 183 lb (83 kg)              We Performed the Following     ORTHO  REFERRAL          Today's Medication Changes          These changes are accurate as of 5/18/18  1:15 PM.  If you have any questions, ask your nurse or doctor.               Start taking these medicines.        Dose/Directions    febuxostat 40 MG Tabs tablet   Commonly known as:  ULORIC   Used for:  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 tophus   Started by:  Naren Sullivan MD        Dose:  40 mg   Take 1 tablet (40 mg) by mouth daily   Quantity:  90 tablet   Refills:  3       losartan 25 MG tablet   Commonly known as:  COZAAR   Used for:  Benign essential hypertension   Started by:  Naren Sullivan MD        Dose:  25 mg   Take 1 tablet (25 mg) by mouth daily   Quantity:  90 tablet   Refills:  1         These medicines have changed or have updated prescriptions.        Dose/Directions    gabapentin 400 MG capsule   Commonly known as:  NEURONTIN   This may have changed:    - how much to take  - how to take this  - when to take this  - additional instructions   Used for:  Peripheral polyneuropathy   Changed by:  Naren Sullivan MD        Dose:  800 mg   Take 2 capsules (800 mg) by mouth 3 times daily   Quantity:  180 capsule   Refills:  11         Stop taking these medicines if you haven't already. Please contact your care team if you have questions.     lisinopril 10 MG tablet   Commonly known as:  PRINIVIL/ZESTRIL   Stopped by:  Naren Sullivan MD                Where to get your medicines      These medications were sent to [facility] [place], [place] - [address]  [address] [address]     Phone:  [phone]     febuxostat 40 MG Tabs tablet    gabapentin 400 MG capsule    losartan 25 MG tablet                 Primary Care Provider Office Phone # Fax #    Lizzy CaraballoNICHOLE serrano [phone] [address] [facility] [facility] [address]        Equal Access to Services     SCOTT REA : Hadii aad ku hadjanieo Sojeanali, waaxda luqadaha, qaybta kaalmada adeegyada, alicia pineda joserobbi espinozasarah thomason. So [facility] [phone].    ATENCIÓN: Si habla español, tiene a cai disposición servicios gratuitos de asistencia lingüística. Llame al [phone].    We comply with applicable federal civil rights laws and [place] laws. We do not discriminate on the basis of race, color, national origin, age, disability, sex, sexual orientation, or gender identity.            Thank you!     Thank you for choosing [facility]  for your care. Our goal is always to provide you with excellent care. Hearing back from our patients is one way we can continue to improve our services. Please take a few minutes to complete the written survey that you may receive in the mail after your visit with us. Thank you!             Your Updated Medication List - Protect others around you: Learn how to safely use, store and throw away your medicines at www.disposemymeds.org.          This list is accurate as of 5/18/18  1:15 PM.  Always use your most recent med list.                   Brand Name Dispense Instructions for use Diagnosis    allopurinol 300 MG tablet    ZYLOPRIM    30 tablet    Take 1 tablet (300 mg) by mouth daily    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 tophus       febuxostat 40 MG Tabs tablet    ULORIC    90 tablet    Take 1 tablet (40 mg) by mouth daily    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 tophus       gabapentin 400 MG capsule    NEURONTIN    180 capsule    Take 2 capsules (800 mg) by mouth 3 times daily    Peripheral polyneuropathy       losartan 25 MG tablet    COZAAR    90 tablet    Take 1 tablet (25 mg) by mouth daily    Benign essential hypertension        Multi-vitamin Tabs tablet     100 tablet    Take 1 tablet by mouth daily Reported on 3/10/2017        priLOSEC 20 MG CR capsule   Generic drug:  omeprazole      Take 20 mg by mouth daily Reported on 3/10/2017        VITAMIN B-1 PO

## 2018-05-31 ENCOUNTER — HOSPITAL ENCOUNTER (OUTPATIENT)
Dept: PHYSICAL THERAPY | Facility: CLINIC | Age: 48
Setting detail: THERAPIES SERIES
End: 2018-05-31
Attending: PODIATRIST
Payer: COMMERCIAL

## 2018-05-31 ENCOUNTER — ALLIED HEALTH/NURSE VISIT (OUTPATIENT)
Dept: FAMILY MEDICINE | Facility: CLINIC | Age: 48
End: 2018-05-31
Payer: COMMERCIAL

## 2018-05-31 ENCOUNTER — TELEPHONE (OUTPATIENT)
Dept: FAMILY MEDICINE | Facility: CLINIC | Age: 48
End: 2018-05-31

## 2018-05-31 ENCOUNTER — TRANSFERRED RECORDS (OUTPATIENT)
Dept: HEALTH INFORMATION MANAGEMENT | Facility: CLINIC | Age: 48
End: 2018-05-31

## 2018-05-31 VITALS — DIASTOLIC BLOOD PRESSURE: 94 MMHG | SYSTOLIC BLOOD PRESSURE: 145 MMHG | HEART RATE: 71 BPM

## 2018-05-31 DIAGNOSIS — G62.9 PERIPHERAL POLYNEUROPATHY: ICD-10-CM

## 2018-05-31 DIAGNOSIS — M1A.0720 CHRONIC IDIOPATHIC GOUT INVOLVING TOE OF LEFT FOOT WITHOUT TOPHUS: ICD-10-CM

## 2018-05-31 DIAGNOSIS — Z01.30 BP CHECK: Primary | ICD-10-CM

## 2018-05-31 DIAGNOSIS — I10 BENIGN ESSENTIAL HYPERTENSION: ICD-10-CM

## 2018-05-31 LAB — URATE SERPL-MCNC: 6.2 MG/DL (ref 3.5–7.2)

## 2018-05-31 PROCEDURE — 36415 COLL VENOUS BLD VENIPUNCTURE: CPT | Performed by: INTERNAL MEDICINE

## 2018-05-31 PROCEDURE — 99207 ZZC NO CHARGE NURSE ONLY: CPT

## 2018-05-31 PROCEDURE — 84550 ASSAY OF BLOOD/URIC ACID: CPT | Performed by: INTERNAL MEDICINE

## 2018-05-31 PROCEDURE — 97140 MANUAL THERAPY 1/> REGIONS: CPT | Mod: GP | Performed by: PHYSICAL THERAPIST

## 2018-05-31 PROCEDURE — 97162 PT EVAL MOD COMPLEX 30 MIN: CPT | Mod: GP | Performed by: PHYSICAL THERAPIST

## 2018-05-31 PROCEDURE — 99000 SPECIMEN HANDLING OFFICE-LAB: CPT | Performed by: INTERNAL MEDICINE

## 2018-05-31 PROCEDURE — 97110 THERAPEUTIC EXERCISES: CPT | Mod: GP | Performed by: PHYSICAL THERAPIST

## 2018-05-31 PROCEDURE — 40000718 ZZHC STATISTIC PT DEPARTMENT ORTHO VISIT: Performed by: PHYSICAL THERAPIST

## 2018-05-31 PROCEDURE — 84425 ASSAY OF VITAMIN B-1: CPT | Mod: 90 | Performed by: INTERNAL MEDICINE

## 2018-05-31 RX ORDER — LOSARTAN POTASSIUM 50 MG/1
50 TABLET ORAL DAILY
Qty: 90 TABLET | Refills: 3 | Status: SHIPPED | OUTPATIENT
Start: 2018-05-31 | End: 2019-05-24

## 2018-05-31 NOTE — TELEPHONE ENCOUNTER
S-(situation): BP check     B-(background): Medication changed by Dr. Sullivan 05/18/18.     A-(assessment):   Vital Signs 5/31/2018 5/31/2018   Systolic 152 145   Diastolic 97 94   Pulse 69 71   Medications and allergies reviewed.   [facility]  New medication for 2 weeks, only missed 1 dose-not today. Taken around 5 pm     Denies:  Chest pain or heart palpitations  Severe headache, blurred vision, nausea, vomiting  Drowsiness or confusion  Persistent numbness and tingling in hands and feet  Coughing up blood or blood-tinged sputum  Difficulty breathing  Persistent nosebleed  Severe weakness  Dizziness or light-headedness     R-(recommendations): Routing to provider for review.          Faith BROWN RN

## 2018-05-31 NOTE — MR AVS SNAPSHOT
"              After Visit Summary   5/31/2018    Santino Adame    MRN: [id number]           Patient Information     Date Of Birth          1970        Visit Information        Provider Department      5/31/2018 1:00 PM COLLETTE NORRIS FP/IM RN [facility]        Today's Diagnoses     BP check    -  1       Follow-ups after your visit        Your next 10 appointments already scheduled     May 31, 2018  1:00 PM CDT   Nurse Only with [facility]/IM RN   [facility] ([facility]    [address] [address] [phone]              Who to contact     If you have questions or need follow up information about today's clinic visit or your schedule please contact [facility] directly at [phone].  Normal or non-critical lab and imaging results will be communicated to you by MyChart, letter or phone within 4 business days after the clinic has received the results. If you do not hear from us within 7 days, please contact the clinic through MyChart or phone. If you have a critical or abnormal lab result, we will notify you by phone as soon as possible.  Submit refill requests through [employer] or call your pharmacy and they will forward the refill request to us. Please allow 3 business days for your refill to be completed.          Additional Information About Your Visit        MyChart Information     [employer] lets you send messages to your doctor, view your test results, renew your prescriptions, schedule appointments and more. To sign up, go to [email or web address] . Click on \"Log in\" on the left side of the screen, which will take you to the Welcome page. Then click on \"Sign up Now\" on the right side of the page.     You will be asked to enter the access code listed below, as well as some personal information. Please follow the directions to create your username and password.     Your access code is: [email or web address]  Expires: 8/16/2018  1:15 PM     Your " access code will  in 90 days. If you need help or a new code, please call your [place] clinic or [phone].        Care EveryWhere ID     This is your Care EveryWhere ID. This could be used by other organizations to access your [place] medical records  [id number]        Your Vitals Were     Pulse                   71            Blood Pressure from Last 3 Encounters:   18 (!) 145/94   18 150/90   17 134/89    Weight from Last 3 Encounters:   18 188 lb (85.3 kg)   17 184 lb (83.5 kg)   03/10/17 183 lb (83 kg)              Today, you had the following     No orders found for display         Today's Medication Changes          These changes are accurate as of 18 12:58 PM.  If you have any questions, ask your nurse or doctor.               Stop taking these medicines if you haven't already. Please contact your care team if you have questions.     allopurinol 300 MG tablet   Commonly known as:  ZYLOPRIM                    Primary Care Provider Office Phone # Fax #    Lizzy Maggy Mcqueen [phone] [address] [address]        Equal Access to Services     SCOTT REA AH: Hadii osorio peterson hadasho Sojeanali, waaxda luqadaha, qaybta kaalmada adeegyada, alicia thomason. So [facility] [phone].    ATENCIÓN: Si habla español, tiene a cai disposición servicios gratuitos de asistencia lingüística. Redame al [phone].    We comply with applicable federal civil rights laws and [place] laws. We do not discriminate on the basis of race, color, national origin, age, disability, sex, sexual orientation, or gender identity.            Thank you!     Thank you for choosing [facility]  for your care. Our goal is always to provide you with excellent care. Hearing back from our patients is one way we can continue to improve our services. Please take a few minutes to complete the written survey that you may receive in  the mail after your visit with us. Thank you!             Your Updated Medication List - Protect others around you: Learn how to safely use, store and throw away your medicines at www.disposemymeds.org.          This list is accurate as of 5/31/18 12:58 PM.  Always use your most recent med list.                   Brand Name Dispense Instructions for use Diagnosis    gabapentin 400 MG capsule    NEURONTIN    180 capsule    Take 2 capsules (800 mg) by mouth 3 times daily    Peripheral polyneuropathy       losartan 25 MG tablet    COZAAR    90 tablet    Take 1 tablet (25 mg) by mouth daily    Benign essential hypertension       Multi-vitamin Tabs tablet     100 tablet    Take 1 tablet by mouth daily Reported on 3/10/2017        priLOSEC 20 MG CR capsule   Generic drug:  omeprazole      Take 20 mg by mouth daily Reported on 3/10/2017        probenecid 500 MG tablet    BENEMID    60 tablet    Take 1/2 tab twice daily for 1 week, then increase to 1 tab twice daily.    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 tophus       VITAMIN B-1 PO

## 2018-05-31 NOTE — TELEPHONE ENCOUNTER
BP not controlled.  Recommend increasing losartan to 50mg daily and return in 2 weeks for labs (ordered) and RN BP check.  Thanks.    Naren Sullivan MD

## 2018-06-01 NOTE — TELEPHONE ENCOUNTER
Patient contacted at the home number and he is told to increase his lorsartan to 50 mg daily abram bp in 2 weeks with labs also. Cierra VOSS RN

## 2018-06-02 LAB — VIT B1 BLD-MCNC: 184 NMOL/L (ref 70–180)

## 2018-06-08 ENCOUNTER — HOSPITAL ENCOUNTER (OUTPATIENT)
Dept: PHYSICAL THERAPY | Facility: CLINIC | Age: 48
Setting detail: THERAPIES SERIES
End: 2018-06-08
Attending: PODIATRIST
Payer: COMMERCIAL

## 2018-06-08 PROCEDURE — 40000718 ZZHC STATISTIC PT DEPARTMENT ORTHO VISIT: Performed by: PHYSICAL THERAPIST

## 2018-06-08 PROCEDURE — 97140 MANUAL THERAPY 1/> REGIONS: CPT | Mod: GP | Performed by: PHYSICAL THERAPIST

## 2018-06-12 NOTE — ADDENDUM NOTE
Encounter addended by: Bhavik Fountain, PT on: 6/12/2018  6:10 AM<BR>     Actions taken: Episode edited, Flowsheet data copied forward, Flowsheet accepted, Charge Capture section accepted

## 2018-06-15 ENCOUNTER — HOSPITAL ENCOUNTER (OUTPATIENT)
Dept: PHYSICAL THERAPY | Facility: CLINIC | Age: 48
Setting detail: THERAPIES SERIES
End: 2018-06-15
Attending: PODIATRIST
Payer: COMMERCIAL

## 2018-06-15 PROCEDURE — 40000718 ZZHC STATISTIC PT DEPARTMENT ORTHO VISIT: Performed by: PHYSICAL THERAPIST

## 2018-06-15 PROCEDURE — 97140 MANUAL THERAPY 1/> REGIONS: CPT | Mod: GP | Performed by: PHYSICAL THERAPIST

## 2018-06-22 ENCOUNTER — ALLIED HEALTH/NURSE VISIT (OUTPATIENT)
Dept: FAMILY MEDICINE | Facility: CLINIC | Age: 48
End: 2018-06-22
Payer: COMMERCIAL

## 2018-06-22 ENCOUNTER — HOSPITAL ENCOUNTER (OUTPATIENT)
Dept: PHYSICAL THERAPY | Facility: CLINIC | Age: 48
Setting detail: THERAPIES SERIES
End: 2018-06-22
Attending: PODIATRIST
Payer: COMMERCIAL

## 2018-06-22 VITALS — SYSTOLIC BLOOD PRESSURE: 124 MMHG | HEART RATE: 72 BPM | DIASTOLIC BLOOD PRESSURE: 76 MMHG

## 2018-06-22 DIAGNOSIS — I10 BENIGN ESSENTIAL HYPERTENSION: Primary | ICD-10-CM

## 2018-06-22 PROCEDURE — 99207 ZZC NO CHARGE NURSE ONLY: CPT

## 2018-06-22 PROCEDURE — 40000718 ZZHC STATISTIC PT DEPARTMENT ORTHO VISIT: Performed by: PHYSICAL THERAPIST

## 2018-06-22 PROCEDURE — 97110 THERAPEUTIC EXERCISES: CPT | Mod: GP | Performed by: PHYSICAL THERAPIST

## 2018-06-22 PROCEDURE — 97140 MANUAL THERAPY 1/> REGIONS: CPT | Mod: GP | Performed by: PHYSICAL THERAPIST

## 2018-06-22 NOTE — MR AVS SNAPSHOT
After Visit Summary   6/22/2018    Santino Adame    MRN: [id number]           Patient Information     Date Of Birth          1970        Visit Information        Provider Department      6/22/2018 10:45 AM COLLETTE GODINEZ/LINNETTE SALDAÑA [facility]        Today's Diagnoses     Benign essential hypertension    -  1       Follow-ups after your visit        Your next 10 appointments already scheduled     Jul 06, 2018 10:30 AM CDT   Ortho Treatment with Bhavik Fountain PT   [facility] ([facility])    [address]  [address]  [place] [place] [id number] [phone]              Future tests that were ordered for you today     Open Future Orders        Priority Expected Expires Ordered    Basic metabolic panel  (Ca, Cl, CO2, Creat, Gluc, K, Na, BUN) Routine  7/31/2018 6/22/2018            Who to contact     If you have questions or need follow up information about today's clinic visit or your schedule please contact [facility] directly at [phone].  Normal or non-critical lab and imaging results will be communicated to you by MyChart, letter or phone within 4 business days after the clinic has received the results. If you do not hear from us within 7 days, please contact the clinic through MyChart or phone. If you have a critical or abnormal lab result, we will notify you by phone as soon as possible.  Submit refill requests through [employer] or call your pharmacy and they will forward the refill request to us. Please allow 3 business days for your refill to be completed.          Additional Information About Your Visit        Care EveryWhere ID     This is your Care EveryWhere ID. This could be used by other organizations to access your [facility] medical records  [id number]        Your Vitals Were     Pulse                   72            Blood Pressure from Last 3 Encounters:   06/22/18 124/76   05/31/18 (!) 145/94   05/18/18 150/90    Weight from  Last 3 Encounters:   05/18/18 188 lb (85.3 kg)   06/23/17 184 lb (83.5 kg)   03/10/17 183 lb (83 kg)               Primary Care Provider Office Phone # Fax #    VilmaJonna Sullivan [phone] [address] [address]        Equal Access to Services     SCOTT REA : Hadii aad ku hadasho Soomaali, waaxda luqadaha, qaybta kaalmada adeegyada, waxay idiin hayaan adeeg kharash laolivern ah. So [facility] [phone].    ATENCIÓN: Si habla español, tiene a cai disposición servicios gratuitos de asistencia lingüística. Llame al [phone].    We comply with applicable federal civil rights laws and [place] laws. We do not discriminate on the basis of race, color, national origin, age, disability, sex, sexual orientation, or gender identity.            Thank you!     Thank you for choosing [facility]  for your care. Our goal is always to provide you with excellent care. Hearing back from our patients is one way we can continue to improve our services. Please take a few minutes to complete the written survey that you may receive in the mail after your visit with us. Thank you!             Your Updated Medication List - Protect others around you: Learn how to safely use, store and throw away your medicines at www.disposemymeds.org.          This list is accurate as of 6/22/18 10:46 AM.  Always use your most recent med list.                   Brand Name Dispense Instructions for use Diagnosis    losartan 50 MG tablet    COZAAR    90 tablet    Take 1 tablet (50 mg) by mouth daily    Benign essential hypertension       Multi-vitamin Tabs tablet     100 tablet    Take 1 tablet by mouth daily Reported on 3/10/2017        priLOSEC 20 MG CR capsule   Generic drug:  omeprazole      Take 20 mg by mouth daily Reported on 3/10/2017        probenecid 500 MG tablet    BENEMID    60 tablet    Take 1/2 tab twice daily for 1 week, then increase to 1 tab twice daily.    Chronic idiopathic gout involving  toe of left foot without tophus       VITAMIN B-1 PO

## 2018-06-22 NOTE — NURSING NOTE
"Santino Adame is a 47 year old year old patient who comes in today for a Blood Pressure check because of medication change.  Losartan was increased to 50 mg daily.  Pt updates doing much better!  Dependent edema is resolved and pt \"lost 20 pounds.\"  Vital Signs as repeated by RN: 124/76, pulse of 72  Patient is taking medication as prescribed  Patient is tolerating medications well.  Patient is not monitoring Blood Pressure at home.  Reminded to try to check readings once a week and be seen if trending above 140/90.  Current complaints: none  Disposition:  At goal.  Pt is due for BMP.  Future lab order placed.  Otherwise, follow up in one year; sooner if needed.    Jennifer Tinoco RN      BP Readings from Last 6 Encounters:   06/22/18 124/76   05/31/18 (!) 145/94   05/18/18 150/90   09/01/17 134/89   06/23/17 (!) 129/93   03/10/17 (!) 149/92     Lab Results   Component Value Date    CR 0.73 03/10/2017     Lab Results   Component Value Date    POTASSIUM 3.4 03/10/2017         "

## 2018-07-06 ENCOUNTER — HOSPITAL ENCOUNTER (OUTPATIENT)
Dept: PHYSICAL THERAPY | Facility: CLINIC | Age: 48
Setting detail: THERAPIES SERIES
End: 2018-07-06
Attending: PODIATRIST
Payer: COMMERCIAL

## 2018-07-06 PROCEDURE — 40000718 ZZHC STATISTIC PT DEPARTMENT ORTHO VISIT: Performed by: PHYSICAL THERAPIST

## 2018-07-06 PROCEDURE — 97110 THERAPEUTIC EXERCISES: CPT | Mod: GP | Performed by: PHYSICAL THERAPIST

## 2018-07-06 PROCEDURE — 97140 MANUAL THERAPY 1/> REGIONS: CPT | Mod: GP | Performed by: PHYSICAL THERAPIST

## 2018-07-24 ENCOUNTER — TELEPHONE (OUTPATIENT)
Dept: FAMILY MEDICINE | Facility: CLINIC | Age: 48
End: 2018-07-24

## 2018-07-24 NOTE — TELEPHONE ENCOUNTER
Pt was last seen by provider for hypertension on 5/18/18.  He was changed from lisinopril to losartan, 25 mg daily.  On 5/31/18, losartan was increased to 50 mg daily.  On 6/22, pt came in for RN blood pressure check and pt was at goal.    Pt was reminded that he was due for lab check but he has not done labs yet.    In the past 10 days, pt has developed moderate bilateral ankle edema.    Denies shortness of breath.  He denies other symptoms. Routed to provider for further instructions please.    Jennifer Tinoco RN    BP Readings from Last 6 Encounters:   06/22/18 124/76   05/31/18 (!) 145/94   05/18/18 150/90   09/01/17 134/89   06/23/17 (!) 129/93   03/10/17 (!) 149/92     Lab Results   Component Value Date    CR 0.73 03/10/2017     Lab Results   Component Value Date    POTASSIUM 3.4 03/10/2017

## 2018-07-24 NOTE — TELEPHONE ENCOUNTER
Reason for Call:  Other med question    Detailed comments: Patient has many questions about his blood pressure medication.  He states he has increased leg swelling.  He refuses an appointment.    Phone Number Patient can be reached at: Cell number on file:    Telephone Information:   Mobile [phone]       Best Time: any    Can we leave a detailed message on this number? YES    Call taken on 7/24/2018 at 2:38 PM by Gemma Wells

## 2018-07-24 NOTE — TELEPHONE ENCOUNTER
Leg swelling most likely due to venous insufficiency.  Would recommend compression socks and elevation.  Would not expect this to be due to his BP med assuming kidney function is still okay- recommend he complete labs as ordered.  If labs okay and swelling persists despite compression, recommend clinic follow-up.  Thanks.    Naren Sullivan MD

## 2018-07-25 NOTE — TELEPHONE ENCOUNTER
Patient notified of MD's recommendations  Patient verbalized understanding and reports he will come in and complete labs on 7/27/18, scheduled appt with lab on 7/27/18  Patient will wait to see the values of his labs before compression stockings.    Routing to provider for KEYONA CROWDER Rn

## 2018-07-27 ENCOUNTER — HOSPITAL ENCOUNTER (OUTPATIENT)
Dept: PHYSICAL THERAPY | Facility: CLINIC | Age: 48
Setting detail: THERAPIES SERIES
End: 2018-07-27
Attending: PODIATRIST
Payer: COMMERCIAL

## 2018-07-27 ENCOUNTER — TELEPHONE (OUTPATIENT)
Dept: FAMILY MEDICINE | Facility: CLINIC | Age: 48
End: 2018-07-27

## 2018-07-27 ENCOUNTER — ALLIED HEALTH/NURSE VISIT (OUTPATIENT)
Dept: FAMILY MEDICINE | Facility: CLINIC | Age: 48
End: 2018-07-27
Payer: COMMERCIAL

## 2018-07-27 VITALS — DIASTOLIC BLOOD PRESSURE: 76 MMHG | SYSTOLIC BLOOD PRESSURE: 122 MMHG

## 2018-07-27 DIAGNOSIS — I10 BENIGN ESSENTIAL HYPERTENSION: ICD-10-CM

## 2018-07-27 DIAGNOSIS — I10 BENIGN ESSENTIAL HYPERTENSION: Primary | ICD-10-CM

## 2018-07-27 LAB
ANION GAP SERPL CALCULATED.3IONS-SCNC: 7 MMOL/L (ref 3–14)
BUN SERPL-MCNC: 9 MG/DL (ref 7–30)
CALCIUM SERPL-MCNC: 8.7 MG/DL (ref 8.5–10.1)
CHLORIDE SERPL-SCNC: 106 MMOL/L (ref 94–109)
CO2 SERPL-SCNC: 25 MMOL/L (ref 20–32)
CREAT SERPL-MCNC: 1 MG/DL (ref 0.66–1.25)
GFR SERPL CREATININE-BSD FRML MDRD: 79 ML/MIN/1.7M2
GLUCOSE SERPL-MCNC: 86 MG/DL (ref 70–99)
POTASSIUM SERPL-SCNC: 3.6 MMOL/L (ref 3.4–5.3)
SODIUM SERPL-SCNC: 138 MMOL/L (ref 133–144)

## 2018-07-27 PROCEDURE — 80048 BASIC METABOLIC PNL TOTAL CA: CPT | Performed by: INTERNAL MEDICINE

## 2018-07-27 PROCEDURE — 97110 THERAPEUTIC EXERCISES: CPT | Mod: GP | Performed by: PHYSICAL THERAPIST

## 2018-07-27 PROCEDURE — 99207 ZZC NO CHARGE NURSE ONLY: CPT

## 2018-07-27 PROCEDURE — 40000718 ZZHC STATISTIC PT DEPARTMENT ORTHO VISIT: Performed by: PHYSICAL THERAPIST

## 2018-07-27 PROCEDURE — 36415 COLL VENOUS BLD VENIPUNCTURE: CPT | Performed by: INTERNAL MEDICINE

## 2018-07-27 PROCEDURE — 97140 MANUAL THERAPY 1/> REGIONS: CPT | Mod: GP | Performed by: PHYSICAL THERAPIST

## 2018-07-27 NOTE — NURSING NOTE
Santino Adame is a 48 year old year old patient who comes in today for a Blood Pressure check because of leg edema.  Vital Signs as repeated by RN Lisa GATES RN  Patient is taking medication as prescribed, takes in evening around 6 pm.   Patient is not tolerating medications well.  Patient is not monitoring Blood Pressure at home.     BP Readings from Last 3 Encounters:   07/27/18 122/76   06/22/18 124/76   05/31/18 (!) 145/94        Bilateral 2 + pitting edema to lower extremities.   Elevates feet in the evening if able for about 30 minutes.   Current complaints: none  Disposition:  Routed to provider for review.     [facility].  [phone] ok to leave detailed message.

## 2018-07-27 NOTE — TELEPHONE ENCOUNTER
Santino Adame is a 48 year old year old patient who comes in today for a Blood Pressure check because of leg edema.  Vital Signs as repeated by RN Lisa GATES RN  Patient is taking medication as prescribed, takes in evening around 6 pm.   Patient is not tolerating medications well.  Patient is not monitoring Blood Pressure at home.     BP Readings from Last 3 Encounters:   07/27/18 122/76   06/22/18 124/76   05/31/18 (!) 145/94        Bilateral 2 + pitting edema to lower extremities.   Elevates feet in the evening if able for about 30 minutes.   Current complaints: none  Disposition:  Routed to provider for review.     [facility].  [phone] ok to leave detailed message.

## 2018-07-27 NOTE — TELEPHONE ENCOUNTER
BP and labs good today.  Would recommend trying the compression stocking as discussed in previous telephone encounter.  If swelling not resolving with stockings for a couple weeks, recommend clinic appointment to evaluation and discuss medications.  Thanks.    Naren Sullivan MD

## 2018-07-27 NOTE — MR AVS SNAPSHOT
After Visit Summary   7/27/2018    Santino Adame    MRN: [id number]           Patient Information     Date Of Birth          1970        Visit Information        Provider Department      7/27/2018 11:00 AM COLLETTE GODINEZ/LINNETTE SALDAÑA [facility]        Today's Diagnoses     Benign essential hypertension    -  1       Follow-ups after your visit        Your next 10 appointments already scheduled     Aug 17, 2018  9:30 AM CDT   Ortho Treatment with Bhavik Fountain PT   [facility] ([facility])    [address]  [address]  [facility] [place] [place] [address] [phone]              Who to contact     If you have questions or need follow up information about today's clinic visit or your schedule please contact [facility] directly at [phone].  Normal or non-critical lab and imaging results will be communicated to you by MyChart, letter or phone within 4 business days after the clinic has received the results. If you do not hear from us within 7 days, please contact the clinic through MyChart or phone. If you have a critical or abnormal lab result, we will notify you by phone as soon as possible.  Submit refill requests through [employer] or call your pharmacy and they will forward the refill request to us. Please allow 3 business days for your refill to be completed.          Additional Information About Your Visit        Care EveryWhere ID     This is your Care EveryWhere ID. This could be used by other organizations to access your [facility] medical records  [id number]         Blood Pressure from Last 3 Encounters:   07/27/18 122/76   06/22/18 124/76   05/31/18 (!) 145/94    Weight from Last 3 Encounters:   05/18/18 188 lb (85.3 kg)   06/23/17 184 lb (83.5 kg)   03/10/17 183 lb (83 kg)              Today, you had the following     No orders found for display       Primary Care Provider Office Phone # Fax #    Naren Sullivan [phone]        [address] [address]        Equal Access to Services     SCOTT REA : Hadii aad ku hadjanieangelica Lamb, waaracelisda shannon, qadavidlinda franzmadany song, alicia morrislizziesarah thomason. So [facility] [phone].    ATENCIÓN: Si habla español, tiene a cai disposición servicios gratuitos de asistencia lingüística. Llame al [phone].    We comply with applicable federal civil rights laws and [place] laws. We do not discriminate on the basis of race, color, national origin, age, disability, sex, sexual orientation, or gender identity.            Thank you!     Thank you for choosing [facility]  for your care. Our goal is always to provide you with excellent care. Hearing back from our patients is one way we can continue to improve our services. Please take a few minutes to complete the written survey that you may receive in the mail after your visit with us. Thank you!             Your Updated Medication List - Protect others around you: Learn how to safely use, store and throw away your medicines at www.disposemymeds.org.          This list is accurate as of 7/27/18 11:24 AM.  Always use your most recent med list.                   Brand Name Dispense Instructions for use Diagnosis    losartan 50 MG tablet    COZAAR    90 tablet    Take 1 tablet (50 mg) by mouth daily    Benign essential hypertension       Multi-vitamin Tabs tablet     100 tablet    Take 1 tablet by mouth daily Reported on 3/10/2017        priLOSEC 20 MG CR capsule   Generic drug:  omeprazole      Take 20 mg by mouth daily Reported on 3/10/2017        probenecid 500 MG tablet    BENEMID    60 tablet    Take 1/2 tab twice daily for 1 week, then increase to 1 tab twice daily.    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 tophus       VITAMIN B-1 PO

## 2018-08-17 ENCOUNTER — HOSPITAL ENCOUNTER (OUTPATIENT)
Dept: PHYSICAL THERAPY | Facility: CLINIC | Age: 48
Setting detail: THERAPIES SERIES
End: 2018-08-17
Attending: PODIATRIST
Payer: COMMERCIAL

## 2018-08-17 PROCEDURE — 97110 THERAPEUTIC EXERCISES: CPT | Mod: GP | Performed by: PHYSICAL THERAPIST

## 2018-08-17 PROCEDURE — 40000718 ZZHC STATISTIC PT DEPARTMENT ORTHO VISIT: Performed by: PHYSICAL THERAPIST

## 2018-08-23 ENCOUNTER — TELEPHONE (OUTPATIENT)
Dept: FAMILY MEDICINE | Facility: CLINIC | Age: 48
End: 2018-08-23

## 2018-08-23 DIAGNOSIS — M10.9 ACUTE GOUTY ARTHRITIS: ICD-10-CM

## 2018-08-23 NOTE — TELEPHONE ENCOUNTER
indomethacin      Last Written Prescription Date:  12/27/17  Last Fill Quantity: 42,   # refills: 1  Last Office Visit: 5/18/18  Future Office visit:       Routing refill request to provider for review/approval because:  Patient is having a gout flare up.

## 2018-08-24 RX ORDER — INDOMETHACIN 25 MG/1
25 CAPSULE ORAL
Qty: 42 CAPSULE | Refills: 1 | Status: SHIPPED | OUTPATIENT
Start: 2018-08-24 | End: 2020-07-10

## 2018-08-24 NOTE — TELEPHONE ENCOUNTER
Indomethacin refill sent.  Follow-up in clinic if symptoms are not improving after a few days of the med.  Thanks.    Naren Sullivan MD

## 2018-08-24 NOTE — TELEPHONE ENCOUNTER
Dr. Sullivan,  Patient is currently having a gout flare up and requesting Indocin.     Routing refill request to provider for review/approval because:  Drug not active on patient's medication list  He was last prescribed this in December of 2017. His LOV was 5/18/18    Thanks,  Luci OJEDA RN

## 2018-08-24 NOTE — TELEPHONE ENCOUNTER
"Left message for patient to call back at [phone].      LOV 5/18/2018:    \"3. 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 tophus     He did not have any improvement frequency of his gout attacks with the allopurinol, so we will try Uloric instead.     - febuxostat (ULORIC) 40 MG TABS tablet; Take 1 tablet (40 mg) by mouth daily  Dispense: 90 tablet; Refill: 3  - Uric acid; Future     ADDENDUM:  Uloric too expensive, will try Probenecid instead.\"        Indocin was reported \"stopped by patient\" in 2017.    Luci OJEDA RN    "

## 2018-09-14 ENCOUNTER — TRANSFERRED RECORDS (OUTPATIENT)
Dept: HEALTH INFORMATION MANAGEMENT | Facility: CLINIC | Age: 48
End: 2018-09-14

## 2018-09-14 ENCOUNTER — HOSPITAL ENCOUNTER (OUTPATIENT)
Dept: PHYSICAL THERAPY | Facility: CLINIC | Age: 48
Setting detail: THERAPIES SERIES
End: 2018-09-14
Attending: PODIATRIST
Payer: COMMERCIAL

## 2018-09-14 PROCEDURE — 97140 MANUAL THERAPY 1/> REGIONS: CPT | Mod: GP | Performed by: PHYSICAL THERAPIST

## 2018-09-14 PROCEDURE — 40000718 ZZHC STATISTIC PT DEPARTMENT ORTHO VISIT: Performed by: PHYSICAL THERAPIST

## 2018-09-17 NOTE — ADDENDUM NOTE
Encounter addended by: Bhavik Fountain, PT on: 9/17/2018  6:26 AM<BR>     Actions taken: Episode edited, Flowsheet data copied forward, Flowsheet accepted, Charge Capture section accepted

## 2019-05-24 ENCOUNTER — OFFICE VISIT (OUTPATIENT)
Dept: FAMILY MEDICINE | Facility: CLINIC | Age: 49
End: 2019-05-24
Payer: COMMERCIAL

## 2019-05-24 VITALS
HEART RATE: 65 BPM | DIASTOLIC BLOOD PRESSURE: 78 MMHG | HEIGHT: 71 IN | BODY MASS INDEX: 25.2 KG/M2 | SYSTOLIC BLOOD PRESSURE: 130 MMHG | WEIGHT: 180 LBS | TEMPERATURE: 98.2 F

## 2019-05-24 DIAGNOSIS — M1A.0720 CHRONIC IDIOPATHIC GOUT INVOLVING TOE OF LEFT FOOT WITHOUT TOPHUS: ICD-10-CM

## 2019-05-24 DIAGNOSIS — E87.6 HYPOKALEMIA: ICD-10-CM

## 2019-05-24 DIAGNOSIS — Z13.220 LIPID SCREENING: ICD-10-CM

## 2019-05-24 DIAGNOSIS — I10 BENIGN ESSENTIAL HYPERTENSION: Primary | ICD-10-CM

## 2019-05-24 LAB
ALT SERPL W P-5'-P-CCNC: 24 U/L (ref 0–70)
ANION GAP SERPL CALCULATED.3IONS-SCNC: 7 MMOL/L (ref 3–14)
BUN SERPL-MCNC: 12 MG/DL (ref 7–30)
CALCIUM SERPL-MCNC: 9 MG/DL (ref 8.5–10.1)
CHLORIDE SERPL-SCNC: 110 MMOL/L (ref 94–109)
CHOLEST SERPL-MCNC: 171 MG/DL
CO2 SERPL-SCNC: 26 MMOL/L (ref 20–32)
CREAT SERPL-MCNC: 0.92 MG/DL (ref 0.66–1.25)
ERYTHROCYTE [DISTWIDTH] IN BLOOD BY AUTOMATED COUNT: 11.9 % (ref 10–15)
GFR SERPL CREATININE-BSD FRML MDRD: >90 ML/MIN/{1.73_M2}
GLUCOSE SERPL-MCNC: 90 MG/DL (ref 70–99)
HCT VFR BLD AUTO: 40.1 % (ref 40–53)
HDLC SERPL-MCNC: 113 MG/DL
HGB BLD-MCNC: 14 G/DL (ref 13.3–17.7)
LDLC SERPL CALC-MCNC: 13 MG/DL
MCH RBC QN AUTO: 33.3 PG (ref 26.5–33)
MCHC RBC AUTO-ENTMCNC: 34.9 G/DL (ref 31.5–36.5)
MCV RBC AUTO: 96 FL (ref 78–100)
NONHDLC SERPL-MCNC: 58 MG/DL
PLATELET # BLD AUTO: 262 10E9/L (ref 150–450)
POTASSIUM SERPL-SCNC: 3.3 MMOL/L (ref 3.4–5.3)
RBC # BLD AUTO: 4.2 10E12/L (ref 4.4–5.9)
SODIUM SERPL-SCNC: 143 MMOL/L (ref 133–144)
TRIGL SERPL-MCNC: 226 MG/DL
URATE SERPL-MCNC: 5.9 MG/DL (ref 3.5–7.2)
WBC # BLD AUTO: 7.4 10E9/L (ref 4–11)

## 2019-05-24 PROCEDURE — 36415 COLL VENOUS BLD VENIPUNCTURE: CPT | Performed by: INTERNAL MEDICINE

## 2019-05-24 PROCEDURE — 99214 OFFICE O/P EST MOD 30 MIN: CPT | Performed by: INTERNAL MEDICINE

## 2019-05-24 PROCEDURE — 84550 ASSAY OF BLOOD/URIC ACID: CPT | Performed by: INTERNAL MEDICINE

## 2019-05-24 PROCEDURE — 84460 ALANINE AMINO (ALT) (SGPT): CPT | Performed by: INTERNAL MEDICINE

## 2019-05-24 PROCEDURE — 80048 BASIC METABOLIC PNL TOTAL CA: CPT | Performed by: INTERNAL MEDICINE

## 2019-05-24 PROCEDURE — 80061 LIPID PANEL: CPT | Performed by: INTERNAL MEDICINE

## 2019-05-24 PROCEDURE — 85027 COMPLETE CBC AUTOMATED: CPT | Performed by: INTERNAL MEDICINE

## 2019-05-24 RX ORDER — PROBENECID 500 MG/1
TABLET, FILM COATED ORAL
Qty: 180 TABLET | Refills: 3 | Status: SHIPPED | OUTPATIENT
Start: 2019-05-24 | End: 2020-06-15

## 2019-05-24 RX ORDER — LOSARTAN POTASSIUM 50 MG/1
50 TABLET ORAL DAILY
Qty: 90 TABLET | Refills: 3 | Status: SHIPPED | OUTPATIENT
Start: 2019-05-24 | End: 2020-06-11

## 2019-05-24 ASSESSMENT — MIFFLIN-ST. JEOR: SCORE: 1695.66

## 2019-05-24 NOTE — PROGRESS NOTES
Subjective     Santino Adame is a 48 year old male who presents to clinic today for the following health issues:  Chief Complaint   Patient presents with     Hypertension     Recheck Medication     indomethacin       HPI   Hypertension Follow-up      Do you check your blood pressure regularly outside of the clinic? No     Are you following a low salt diet? Yes    Are your blood pressures ever more than 140 on the top number (systolic) OR more   than 90 on the bottom number (diastolic), for example 140/90? unknown    Amount of exercise or physical activity: nothing real strenuous, for work on feet 10 hours/day    Problems taking medications regularly: No    Medication side effects: none    Diet: regular (no restrictions)      Medication Followup of probenecid.  He was prescribed febuxostat last year, but pharmacy changed this to probenecid since it was cheaper     Taking Medication as prescribed: yes    Side Effects:  None    Medication Helping Symptoms:  Yes- not having any flares recently       Patient Active Problem List   Diagnosis     Mouth ulcers     CARDIOVASCULAR SCREENING; LDL GOAL LESS THAN 160     Gout     Benign essential hypertension     Past Surgical History:   Procedure Laterality Date     right leg surgery  2007       Social History     Tobacco Use     Smoking status: Former Smoker     Packs/day: 0.50     Types: Cigarettes, Dip, chew, snus or snuff     Smokeless tobacco: Current User     Types: Chew, Snuff     Tobacco comment: 1 pack every 3 days   Substance Use Topics     Alcohol use: Yes     Comment: daily- a few beers     Family History   Problem Relation Age of Onset     Hypertension Father          Current Outpatient Medications   Medication Sig Dispense Refill     losartan (COZAAR) 50 MG tablet Take 1 tablet (50 mg) by mouth daily 90 tablet 3     multivitamin, therapeutic with minerals (MULTI-VITAMIN) TABS Take 1 tablet by mouth daily Reported on 3/10/2017 100 tablet 3     omeprazole (PRILOSEC)  "20 MG capsule Take 20 mg by mouth daily Reported on 3/10/2017       probenecid (BENEMID) 500 MG tablet TAKE ONE TABLET BY MOUTH TWICE A DAY. 180 tablet 3     indomethacin (INDOCIN) 25 MG capsule Take 1 capsule (25 mg) by mouth 3 times daily (with meals) 42 capsule 1     Thiamine HCl (VITAMIN B-1 PO)        Allergies   Allergen Reactions     Amoxicillin      Lisinopril Nausea and Vomiting       Reviewed and updated as needed this visit by Provider         Review of Systems   ROS COMP: Constitutional, cardiovascular, MSK systems are negative, except as otherwise noted.      Objective    /78   Pulse 65   Temp 98.2  F (36.8  C) (Tympanic)   Ht 1.791 m (5' 10.5\")   Wt 81.6 kg (180 lb)   BMI 25.46 kg/m    Body mass index is 25.46 kg/m .  Physical Exam   GENERAL: healthy, alert and no distress  RESP: lungs clear to auscultation - no rales, rhonchi or wheezes  CV: regular rate and rhythm, normal S1 S2, no S3 or S4, no murmur, click or rub, mild bilateral peripheral edema in the ankles            Assessment & Plan     1. Benign essential hypertension    Controlled, due for labs, med refilled    - losartan (COZAAR) 50 MG tablet; Take 1 tablet (50 mg) by mouth daily  Dispense: 90 tablet; Refill: 3  - **Basic metabolic panel FUTURE anytime    2. 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 tophus    Controlled on probenecid, due for labs, refills provided    - probenecid (BENEMID) 500 MG tablet; TAKE ONE TABLET BY MOUTH TWICE A DAY.  Dispense: 180 tablet; Refill: 3  - **ALT FUTURE anytime  - Uric acid  - CBC with platelets    3. Lipid screening    - Lipid panel reflex to direct LDL Fasting       Return in about 1 year (around 5/24/2020) for Routine Visit.    Naren Sullivan MD  [facility] - IM      "

## 2019-07-15 ENCOUNTER — TELEPHONE (OUTPATIENT)
Dept: FAMILY MEDICINE | Facility: CLINIC | Age: 49
End: 2019-07-15

## 2019-07-15 DIAGNOSIS — R19.7 DIARRHEA, UNSPECIFIED TYPE: Primary | ICD-10-CM

## 2020-06-10 ENCOUNTER — TELEPHONE (OUTPATIENT)
Dept: FAMILY MEDICINE | Facility: CLINIC | Age: 50
End: 2020-06-10

## 2020-06-10 DIAGNOSIS — I10 BENIGN ESSENTIAL HYPERTENSION: ICD-10-CM

## 2020-06-10 NOTE — LETTER
[facility]  [address] [address]  Phone: [phone]       June 11, 2020         Santino Adame  [address] [address]            Dear Santino:    We are concerned about your health care.  We recently provided you with medication refills.  Many medications require routine follow-up with your doctor.    Please schedule a virtual visit (either telephone or video visit) by calling the clinic.     Your prescription(s) have been refilled for 90 days so you may have time for the above noted follow-up. Please call to schedule soon so we can assure you have an appointment before your next refills are needed.    Thank you,      Venecia Sullivan MD / farhana

## 2020-06-11 RX ORDER — LOSARTAN POTASSIUM 50 MG/1
TABLET ORAL
Qty: 90 TABLET | Refills: 0 | Status: SHIPPED | OUTPATIENT
Start: 2020-06-11 | End: 2020-07-09

## 2020-06-15 DIAGNOSIS — M1A.0720 CHRONIC IDIOPATHIC GOUT INVOLVING TOE OF LEFT FOOT WITHOUT TOPHUS: ICD-10-CM

## 2020-06-15 RX ORDER — PROBENECID 500 MG/1
TABLET, FILM COATED ORAL
Qty: 180 TABLET | Refills: 0 | Status: SHIPPED | OUTPATIENT
Start: 2020-06-15 | End: 2020-07-09

## 2020-06-15 NOTE — TELEPHONE ENCOUNTER
90 day refill sent.  Patient was already recently sent letter advising him to schedule virtual visit for med follow-up.  Lab orders are placed.    Naren Sullivan MD

## 2020-06-15 NOTE — TELEPHONE ENCOUNTER
"Requested Prescriptions   Pending Prescriptions Disp Refills     probenecid (BENEMID) 500 MG tablet 180 tablet 3     Sig: TAKE ONE TABLET BY MOUTH TWICE A DAY.       Gout Agents Protocol Failed - 6/15/2020  8:50 AM        Failed - CBC on file in past 12 months     Recent Labs   Lab Test 05/24/19  0841   WBC 7.4   RBC 4.20*   HGB 14.0   HCT 40.1                    Failed - ALT on file in past 12 months     Recent Labs   Lab Test 05/24/19  0841   ALT 24             Failed - Has Uric Acid on file in past 12 months and value is less than 6     Recent Labs   Lab Test 05/24/19  0841   URIC 5.9     If level is 6mg/dL or greater, ok to refill one time and refer to provider.           Failed - Recent (12 mo) or future (30 days) visit within the authorizing provider's specialty     Patient has had an office visit with the authorizing provider or a provider within the authorizing providers department within the previous 12 mos or has a future within next 30 days. See \"Patient Info\" tab in inbasket, or \"Choose Columns\" in Meds & Orders section of the refill encounter.              Failed - Normal serum creatinine on file in the past 12 months     Recent Labs   Lab Test 05/24/19  0841   CR 0.92       Ok to refill medication if creatinine is low          Passed - Medication is active on med list        Passed - Patient is age 18 or older           Last Written Prescription Date:  5/24/2019  Last Fill Quantity: 180,  # refills: 3   Last office visit: 5/24/2019 with prescribing provider:  Nate  Future Office Visit:            "

## 2020-07-09 NOTE — PROGRESS NOTES
"Santino Adame is a 50 year old male who is being evaluated via a billable telephone visit.      The patient has been notified of following:     \"This telephone visit will be conducted via a call between you and your physician/provider. We have found that certain health care needs can be provided without the need for a physical exam.  This service lets us provide the care you need with a short phone conversation.  If a prescription is necessary we can send it directly to your pharmacy.  If lab work is needed we can place an order for that and you can then stop by our lab to have the test done at a later time.    Telephone visits are billed at different rates depending on your insurance coverage. During this emergency period, for some insurers they may be billed the same as an in-person visit.  Please reach out to your insurance provider with any questions.    If during the course of the call the physician/provider feels a telephone visit is not appropriate, you will not be charged for this service.\"    Patient has given verbal consent for Telephone visit?  Yes    What phone number would you like to be contacted at? [phone]    How would you like to obtain your AVS? Mail a copy    Subjective     Santino Adame is a 50 year old male who presents via phone visit today for the following health issues:    HPI  Hypertension Follow-up      Do you check your blood pressure regularly outside of the clinic? Yes   135/83 a couple days ago.  Today: 129/79, HR 80 today    Are you following a low salt diet? Yes    Are your blood pressures ever more than 140 on the top number (systolic) OR more   than 90 on the bottom number (diastolic), for example 140/90? No        Medication Followup of probenecid (BENEMID) 500 MG tablet     Taking Medication as prescribed: yes    Side Effects:  None    Medication Helping Symptoms:  Yes- no gout flare for a couple of years, has indomethacin on hand if needed          Patient Active Problem List "   Diagnosis     Mouth ulcers     CARDIOVASCULAR SCREENING; LDL GOAL LESS THAN 160     Gout     Benign essential hypertension     Past Surgical History:   Procedure Laterality Date     right leg surgery  2007       Social History     Tobacco Use     Smoking status: Former Smoker     Packs/day: 0.50     Types: Cigarettes, Dip, chew, snus or snuff     Smokeless tobacco: Current User     Types: Chew, Snuff     Tobacco comment: 1 pack every 3 days   Substance Use Topics     Alcohol use: Yes     Comment: daily- a few beers     Family History   Problem Relation Age of Onset     Hypertension Father          Current Outpatient Medications   Medication Sig Dispense Refill     losartan (COZAAR) 50 MG tablet TAKE ONE TABLET BY MOUTH ONCE DAILY 90 tablet 0     multivitamin, therapeutic with minerals (MULTI-VITAMIN) TABS Take 1 tablet by mouth daily Reported on 3/10/2017 100 tablet 3     omeprazole (PRILOSEC) 20 MG capsule Take 20 mg by mouth daily Reported on 3/10/2017       probenecid (BENEMID) 500 MG tablet TAKE ONE TABLET BY MOUTH TWICE A DAY. 180 tablet 0     Thiamine HCl (VITAMIN B-1 PO)        indomethacin (INDOCIN) 25 MG capsule Take 1 capsule (25 mg) by mouth 3 times daily (with meals) (Patient not taking: Reported on 7/10/2020) 42 capsule 1     Allergies   Allergen Reactions     Amoxicillin      Lisinopril Nausea and Vomiting       Reviewed and updated as needed this visit by Provider         Review of Systems   Constitutional, CV, MSK systems are negative, except as otherwise noted.       Objective   Reported vitals:  There were no vitals taken for this visit.   healthy, alert and no distress  PSYCH: Alert and oriented times 3; coherent speech, normal   rate and volume, able to articulate logical thoughts, able   to abstract reason, no tangential thoughts, no hallucinations   or delusions  His affect is normal  RESP: No cough, no audible wheezing, able to talk in full sentences  Remainder of exam unable to be completed  due to telephone visits          Assessment/Plan:  1. 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 tophus    Well controlled on probenecid, will continue.  Indomethacin placed on file if needed.  Due for labs- previously ordered, he will schedule an appointment.     - probenecid (BENEMID) 500 MG tablet; TAKE ONE TABLET BY MOUTH TWICE A DAY.  Dispense: 180 tablet; Refill: 3  - indomethacin (INDOCIN) 25 MG capsule; Take 1 capsule (25 mg) by mouth 3 times daily (with meals)  Dispense: 42 capsule; Refill: 1    2. Benign essential hypertension    Home BP is good today, continue losartan.  Due for labs- previously ordered, he will schedule an appointment.     - losartan (COZAAR) 50 MG tablet; Take 1 tablet (50 mg) by mouth daily  Dispense: 90 tablet; Refill: 3    3. Special screening for malignant neoplasms, colon    - Fecal colorectal cancer screen (FIT); Future    Return in about 1 month (around 8/10/2020) for Lab Work. and Tetanus vaccine appointment.  Advised him to check on coverage for Shingrix vaccine.        Phone call duration:  7 minutes    Naren Sullivan MD

## 2020-07-10 ENCOUNTER — VIRTUAL VISIT (OUTPATIENT)
Dept: FAMILY MEDICINE | Facility: CLINIC | Age: 50
End: 2020-07-10
Payer: COMMERCIAL

## 2020-07-10 DIAGNOSIS — Z12.11 SPECIAL SCREENING FOR MALIGNANT NEOPLASMS, COLON: ICD-10-CM

## 2020-07-10 DIAGNOSIS — M1A.0720 CHRONIC IDIOPATHIC GOUT INVOLVING TOE OF LEFT FOOT WITHOUT TOPHUS: ICD-10-CM

## 2020-07-10 DIAGNOSIS — I10 BENIGN ESSENTIAL HYPERTENSION: Primary | ICD-10-CM

## 2020-07-10 PROCEDURE — 99214 OFFICE O/P EST MOD 30 MIN: CPT | Mod: TEL | Performed by: INTERNAL MEDICINE

## 2020-07-10 RX ORDER — INDOMETHACIN 25 MG/1
25 CAPSULE ORAL
Qty: 42 CAPSULE | Refills: 1 | Status: SHIPPED | OUTPATIENT
Start: 2020-07-10 | End: 2021-07-30

## 2020-07-10 RX ORDER — PROBENECID 500 MG/1
TABLET, FILM COATED ORAL
Qty: 180 TABLET | Refills: 3 | Status: SHIPPED | OUTPATIENT
Start: 2020-07-10 | End: 2021-07-30

## 2020-07-10 RX ORDER — LOSARTAN POTASSIUM 50 MG/1
50 TABLET ORAL DAILY
Qty: 90 TABLET | Refills: 3 | Status: SHIPPED | OUTPATIENT
Start: 2020-07-10 | End: 2021-07-30

## 2020-07-17 ENCOUNTER — ALLIED HEALTH/NURSE VISIT (OUTPATIENT)
Dept: FAMILY MEDICINE | Facility: CLINIC | Age: 50
End: 2020-07-17
Payer: COMMERCIAL

## 2020-07-17 DIAGNOSIS — D64.9 ANEMIA, UNSPECIFIED TYPE: Primary | ICD-10-CM

## 2020-07-17 DIAGNOSIS — M1A.0720 CHRONIC IDIOPATHIC GOUT INVOLVING TOE OF LEFT FOOT WITHOUT TOPHUS: ICD-10-CM

## 2020-07-17 DIAGNOSIS — Z23 NEED FOR VACCINATION: Primary | ICD-10-CM

## 2020-07-17 LAB
ALT SERPL W P-5'-P-CCNC: 25 U/L (ref 0–70)
ANION GAP SERPL CALCULATED.3IONS-SCNC: 6 MMOL/L (ref 3–14)
BUN SERPL-MCNC: 8 MG/DL (ref 7–30)
CALCIUM SERPL-MCNC: 8.7 MG/DL (ref 8.5–10.1)
CHLORIDE SERPL-SCNC: 108 MMOL/L (ref 94–109)
CO2 SERPL-SCNC: 26 MMOL/L (ref 20–32)
CREAT SERPL-MCNC: 0.8 MG/DL (ref 0.66–1.25)
ERYTHROCYTE [DISTWIDTH] IN BLOOD BY AUTOMATED COUNT: 11.2 % (ref 10–15)
GFR SERPL CREATININE-BSD FRML MDRD: >90 ML/MIN/{1.73_M2}
GLUCOSE SERPL-MCNC: 78 MG/DL (ref 70–99)
HCT VFR BLD AUTO: 37.2 % (ref 40–53)
HGB BLD-MCNC: 13 G/DL (ref 13.3–17.7)
MCH RBC QN AUTO: 33.2 PG (ref 26.5–33)
MCHC RBC AUTO-ENTMCNC: 34.9 G/DL (ref 31.5–36.5)
MCV RBC AUTO: 95 FL (ref 78–100)
PLATELET # BLD AUTO: 301 10E9/L (ref 150–450)
POTASSIUM SERPL-SCNC: 3.4 MMOL/L (ref 3.4–5.3)
RBC # BLD AUTO: 3.91 10E12/L (ref 4.4–5.9)
SODIUM SERPL-SCNC: 140 MMOL/L (ref 133–144)
URATE SERPL-MCNC: 6.1 MG/DL (ref 3.5–7.2)
WBC # BLD AUTO: 9.6 10E9/L (ref 4–11)

## 2020-07-17 PROCEDURE — 80048 BASIC METABOLIC PNL TOTAL CA: CPT | Performed by: INTERNAL MEDICINE

## 2020-07-17 PROCEDURE — 90715 TDAP VACCINE 7 YRS/> IM: CPT

## 2020-07-17 PROCEDURE — 84550 ASSAY OF BLOOD/URIC ACID: CPT | Performed by: INTERNAL MEDICINE

## 2020-07-17 PROCEDURE — 99207 ZZC NO CHARGE NURSE ONLY: CPT

## 2020-07-17 PROCEDURE — 36415 COLL VENOUS BLD VENIPUNCTURE: CPT | Performed by: INTERNAL MEDICINE

## 2020-07-17 PROCEDURE — 85027 COMPLETE CBC AUTOMATED: CPT | Performed by: INTERNAL MEDICINE

## 2020-07-17 PROCEDURE — 90471 IMMUNIZATION ADMIN: CPT

## 2020-07-17 PROCEDURE — 84460 ALANINE AMINO (ALT) (SGPT): CPT | Performed by: INTERNAL MEDICINE

## 2020-07-17 NOTE — PROGRESS NOTES
Nate, Naren Coyle MD   7/17/2020  5:52 PM       Please let Santino know that his uric acid, ALT liver test, electrolytes, and kidney function are normal.  Blood cell counts show some borderline anemia, which sometimes can be a side effect of his probenecid medication.  He should definitely turn in the FIT colon cancer screening test though to make sure he is not bleeding small amounts into his digestive tract that could be causing anemia.  I'd recommend having the blood cell counts rechecked in about a month to make sure this is not getting worse.       Thanks,   Naren Sullivan MD

## 2020-07-17 NOTE — NURSING NOTE
Prior to immunization administration, verified patients identity using patient s name and date of birth. Please see Immunization Activity for additional information.     Screening Questionnaire for Adult Immunization    Are you sick today?   No   Do you have allergies to medications, food, a vaccine component or latex?   No   Have you ever had a serious reaction after receiving a vaccination?   No   Do you have a long-term health problem with heart, lung, kidney, or metabolic disease (e.g., diabetes), asthma, a blood disorder, no spleen, complement component deficiency, a cochlear implant, or a spinal fluid leak?  Are you on long-term aspirin therapy?   No   Do you have cancer, leukemia, HIV/AIDS, or any other immune system problem?   No   Do you have a parent, brother, or sister with an immune system problem?   No   In the past 3 months, have you taken medications that affect  your immune system, such as prednisone, other steroids, or anticancer drugs; drugs for the treatment of rheumatoid arthritis, Crohn s disease, or psoriasis; or have you had radiation treatments?   No   Have you had a seizure, or a brain or other nervous system problem?   No   During the past year, have you received a transfusion of blood or blood    products, or been given immune (gamma) globulin or antiviral drug?   No   For women: Are you pregnant or is there a chance you could become       pregnant during the next month?   No   Have you received any vaccinations in the past 4 weeks?   No     Immunization questionnaire answers were all negative.        Injection of tdap given by Yisel Rodriguez MA. Patient instructed to remain in clinic for 15 minutes afterwards, and to report any adverse reaction to me immediately.       Screening performed by Yisel Rodriguez MA on 7/17/2020 at 1:38 PM.

## 2020-08-14 DIAGNOSIS — Z12.11 SPECIAL SCREENING FOR MALIGNANT NEOPLASMS, COLON: ICD-10-CM

## 2020-08-14 PROCEDURE — 82274 ASSAY TEST FOR BLOOD FECAL: CPT | Performed by: INTERNAL MEDICINE

## 2020-08-30 LAB — HEMOCCULT STL QL IA: NEGATIVE

## 2021-04-07 ENCOUNTER — HOSPITAL ENCOUNTER (EMERGENCY)
Facility: CLINIC | Age: 51
Discharge: HOME | End: 2021-04-07
Attending: PHYSICIAN ASSISTANT | Admitting: PHYSICIAN ASSISTANT
Payer: COMMERCIAL

## 2021-04-07 ENCOUNTER — APPOINTMENT (OUTPATIENT)
Dept: GENERAL RADIOLOGY | Facility: CLINIC | Age: 51
End: 2021-04-07
Attending: PHYSICIAN ASSISTANT
Payer: COMMERCIAL

## 2021-04-07 VITALS
OXYGEN SATURATION: 99 % | SYSTOLIC BLOOD PRESSURE: 168 MMHG | HEIGHT: 71 IN | RESPIRATION RATE: 20 BRPM | HEART RATE: 85 BPM | WEIGHT: 175 LBS | DIASTOLIC BLOOD PRESSURE: 90 MMHG | BODY MASS INDEX: 24.5 KG/M2 | TEMPERATURE: 98 F

## 2021-04-07 DIAGNOSIS — R07.81 RIB PAIN ON RIGHT SIDE: ICD-10-CM

## 2021-04-07 PROCEDURE — 71101 X-RAY EXAM UNILAT RIBS/CHEST: CPT | Mod: RT

## 2021-04-07 PROCEDURE — 99213 OFFICE O/P EST LOW 20 MIN: CPT | Performed by: PHYSICIAN ASSISTANT

## 2021-04-07 PROCEDURE — G0463 HOSPITAL OUTPT CLINIC VISIT: HCPCS | Performed by: PHYSICIAN ASSISTANT

## 2021-04-07 RX ORDER — HYDROCODONE BITARTRATE AND ACETAMINOPHEN 5; 325 MG/1; MG/1
1-2 TABLET ORAL EVERY 6 HOURS PRN
Qty: 15 TABLET | Refills: 0 | Status: SHIPPED | OUTPATIENT
Start: 2021-04-07 | End: 2021-04-10

## 2021-04-07 ASSESSMENT — MIFFLIN-ST. JEOR: SCORE: 1670.92

## 2021-04-07 NOTE — Clinical Note
Santino Adame was seen and treated in our emergency department on 4/7/2021.    He should have light duty as tolerated by pain for the next seven days or until next follow up visit.  During this time he may need to avoid twisting/turning of torso, heavy lifting greater than 15 pounds, overhead reaching of the shoulder or other movements that exacerbate his pain.      Sincerely,     [facility] Emergency Dept

## 2021-04-07 NOTE — ED PROVIDER NOTES
History     Chief Complaint   Patient presents with     Fall     rib pain  following a fall yesterday.     HPI  Santino Adame is a 50 year old male who presents to the urgent care with concern of a right-sided rib/chest wall pain after he sustained a fall yesterday.  Patient reports that he was standing on a ladder when he slipped and the right side of his chest wall fell on top ladder rung.  He did not actually fall off the ladder.  Since then he has noted some ecchymosis.  He also complains of pain with a deep breathing however denies any actual shortness of breath.  He denies any other injuries.  No headache, neck pain, palpitations,   Abdominal pain, nausea, vomiting, diarrhea.  He has attempted treatment with OTC medications without relief.  He does not have prior history of right sided rib fractures from injury several years ago.      Allergies:  Allergies   Allergen Reactions     Amoxicillin      Lisinopril Nausea and Vomiting     Problem List:    Patient Active Problem List    Diagnosis Date Noted     Benign essential hypertension 07/11/2017     Priority: Medium     Gout 11/22/2013     Priority: Medium     Problem list name updated by automated process. Provider to review       CARDIOVASCULAR SCREENING; LDL GOAL LESS THAN 160 08/21/2013     Priority: Medium     Mouth ulcers 08/15/2013     Priority: Medium      Past Medical History:    History reviewed. No pertinent past medical history.    Past Surgical History:    Past Surgical History:   Procedure Laterality Date     right leg surgery  2007     Family History:    Family History   Problem Relation Age of Onset     Hypertension Father      Social History:  Marital Status:  Single [1]  Social History     Tobacco Use     Smoking status: Former Smoker     Packs/day: 0.50     Types: Cigarettes, Dip, chew, snus or snuff     Smokeless tobacco: Current User     Types: Chew, Snuff     Tobacco comment: 1 pack every 3 days   Substance Use Topics     Alcohol use: Yes  "    Comment: daily- a few beers     Drug use: No      Medications:    indomethacin (INDOCIN) 25 MG capsule  losartan (COZAAR) 50 MG tablet  multivitamin, therapeutic with minerals (MULTI-VITAMIN) TABS  omeprazole (PRILOSEC) 20 MG capsule  probenecid (BENEMID) 500 MG tablet  Thiamine HCl (VITAMIN B-1 PO)      Review of Systems   Constitutional: Negative for chills and fever.   Respiratory: Negative for cough, shortness of breath and wheezing.    Cardiovascular: Positive for chest pain (right sided ribs/chest wall). Negative for palpitations.   Gastrointestinal: Negative for abdominal pain, diarrhea, nausea and vomiting.   Musculoskeletal: Negative for back pain, myalgias and neck pain.   Skin: Positive for color change (ecchymosis). Negative for rash and wound.   Neurological: Negative for dizziness, light-headedness and headaches.     Physical Exam   BP: (!) 168/90  Pulse: 85  Temp: 98  F (36.7  C)  Resp: 20  Height: 180.3 cm (5' 11\")  Weight: 79.4 kg (175 lb)  SpO2: 99 %      Physical Exam  Constitutional:       General: He is not in acute distress.     Appearance: He is not ill-appearing or toxic-appearing.   HENT:      Head: Normocephalic and atraumatic.   Neck:      Comments: Normal spontaneous movement  Cardiovascular:      Rate and Rhythm: Normal rate and regular rhythm.      Heart sounds: No murmur. No friction rub. No gallop.    Pulmonary:      Effort: Pulmonary effort is normal. No respiratory distress.      Breath sounds: Normal breath sounds. No wheezing, rhonchi or rales.   Chest:      Chest wall: Tenderness present. No mass, lacerations, deformity, swelling or crepitus.       Abdominal:      General: Abdomen is flat. Bowel sounds are normal.      Palpations: Abdomen is soft.   Neurological:      Mental Status: He is alert.      GCS: GCS eye subscore is 4. GCS verbal subscore is 5. GCS motor subscore is 6.       ED Course        Procedures          Critical Care time:  none            Results for orders " placed or performed during the hospital encounter of 04/07/21   Ribs XR, unilat 3 views + PA chest, right     Status: None    Narrative    RIBS AND CHEST RIGHT THREE VIEWS    4/7/2021 3:11 PM     HISTORY: Right chest pain after fall.    COMPARISON: None.      Impression    IMPRESSION: Chest x-ray is unremarkable. No pneumothorax.    There is a marker over the lower right lateral ribs. No definite  evidence of acute fracture. There appears to be slight offset of the  sixth right posterior rib that could be related to overlapping shadows  or fracture. This is not in the region of pain and is likely again  related to overlapping shadows or old fracture.    MOHAMUD PRUITT MD     Medications - No data to display    Assessments & Plan (with Medical Decision Making)     I have reviewed the nursing notes.    I have reviewed the findings, diagnosis, plan and need for follow up with the patient.       New Prescriptions    No medications on file       Final diagnoses:   Rib pain on right side     50-year-old male presents to the urgent care with concern over right-sided rib/chest wall pain after he sustained a fall while standing on a ladder yesterday hitting the side of his chest on the top of the lung.  He had stable vital signs upon arrival.  Physical exam findings significant for ecchymosis over the lateral chest wall.  He did have x-ray which was negative for definitive acute rib fracture, slight offset of the 6 right posterior rib that could be related to overlapping shadows or fracture, not in the region of pain per radiology report and is likely secondary to overlapping shadows or old fracture.  Patient was notified of findings.  Differential for symptoms would include chest wall contusion.  He was discharged home stable with instructions for symptomatic treatment with rest, Tylenol/ibuprofen, did agree to provide a small number of Norco to be used as needed for breakthrough pain. Follow up with PCP if no improvement  in 5-7 days.  Worrisome reasons to return to ER/UC sooner discussed.     Disclaimer: This note consists of symbols derived from keyboarding, dictation, and/or voice recognition software. As a result, there may be errors in the script that have gone undetected.  Please consider this when interpreting information found in the chart.    4/7/2021   [facility] EMERGENCY DEPT     Corry Sheth PA-C  04/12/21 [id number]

## 2021-04-12 ASSESSMENT — ENCOUNTER SYMPTOMS
ABDOMINAL PAIN: 0
MYALGIAS: 0
FEVER: 0
DIZZINESS: 0
WHEEZING: 0
DIARRHEA: 0
NECK PAIN: 0
VOMITING: 0
PALPITATIONS: 0
LIGHT-HEADEDNESS: 0
COLOR CHANGE: 1
CHILLS: 0
NAUSEA: 0
WOUND: 0
SHORTNESS OF BREATH: 0
HEADACHES: 0
BACK PAIN: 0
COUGH: 0

## 2021-07-30 ENCOUNTER — TELEPHONE (OUTPATIENT)
Dept: FAMILY MEDICINE | Facility: CLINIC | Age: 51
End: 2021-07-30

## 2021-07-30 ENCOUNTER — OFFICE VISIT (OUTPATIENT)
Dept: FAMILY MEDICINE | Facility: CLINIC | Age: 51
End: 2021-07-30
Payer: COMMERCIAL

## 2021-07-30 VITALS
BODY MASS INDEX: 24.69 KG/M2 | HEART RATE: 84 BPM | DIASTOLIC BLOOD PRESSURE: 82 MMHG | SYSTOLIC BLOOD PRESSURE: 126 MMHG | WEIGHT: 176.4 LBS | TEMPERATURE: 98 F | OXYGEN SATURATION: 98 % | HEIGHT: 71 IN | RESPIRATION RATE: 16 BRPM

## 2021-07-30 DIAGNOSIS — B07.0 PLANTAR WARTS: ICD-10-CM

## 2021-07-30 DIAGNOSIS — Z51.81 ENCOUNTER FOR THERAPEUTIC DRUG MONITORING: ICD-10-CM

## 2021-07-30 DIAGNOSIS — M1A.0720 CHRONIC IDIOPATHIC GOUT INVOLVING TOE OF LEFT FOOT WITHOUT TOPHUS: ICD-10-CM

## 2021-07-30 DIAGNOSIS — I10 BENIGN ESSENTIAL HYPERTENSION: ICD-10-CM

## 2021-07-30 DIAGNOSIS — Z00.00 ROUTINE HEALTH MAINTENANCE: Primary | ICD-10-CM

## 2021-07-30 DIAGNOSIS — E87.6 HYPOKALEMIA: ICD-10-CM

## 2021-07-30 DIAGNOSIS — Z12.11 SPECIAL SCREENING FOR MALIGNANT NEOPLASMS, COLON: ICD-10-CM

## 2021-07-30 LAB
ALT SERPL W P-5'-P-CCNC: 20 U/L (ref 0–70)
ANION GAP SERPL CALCULATED.3IONS-SCNC: 9 MMOL/L (ref 3–14)
BUN SERPL-MCNC: 8 MG/DL (ref 7–30)
CALCIUM SERPL-MCNC: 7.7 MG/DL (ref 8.5–10.1)
CHLORIDE BLD-SCNC: 108 MMOL/L (ref 94–109)
CO2 SERPL-SCNC: 25 MMOL/L (ref 20–32)
CREAT SERPL-MCNC: 0.88 MG/DL (ref 0.66–1.25)
ERYTHROCYTE [DISTWIDTH] IN BLOOD BY AUTOMATED COUNT: 11.7 % (ref 10–15)
GFR SERPL CREATININE-BSD FRML MDRD: >90 ML/MIN/1.73M2
GLUCOSE BLD-MCNC: 85 MG/DL (ref 70–99)
HCT VFR BLD AUTO: 38.6 % (ref 40–53)
HGB BLD-MCNC: 13 G/DL (ref 13.3–17.7)
MCH RBC QN AUTO: 33.6 PG (ref 26.5–33)
MCHC RBC AUTO-ENTMCNC: 33.7 G/DL (ref 31.5–36.5)
MCV RBC AUTO: 100 FL (ref 78–100)
PLATELET # BLD AUTO: 244 10E3/UL (ref 150–450)
POTASSIUM BLD-SCNC: 3.2 MMOL/L (ref 3.4–5.3)
RBC # BLD AUTO: 3.87 10E6/UL (ref 4.4–5.9)
SODIUM SERPL-SCNC: 142 MMOL/L (ref 133–144)
URATE SERPL-MCNC: 6 MG/DL (ref 3.5–7.2)
WBC # BLD AUTO: 8 10E3/UL (ref 4–11)

## 2021-07-30 PROCEDURE — 36415 COLL VENOUS BLD VENIPUNCTURE: CPT | Performed by: INTERNAL MEDICINE

## 2021-07-30 PROCEDURE — 85027 COMPLETE CBC AUTOMATED: CPT | Performed by: INTERNAL MEDICINE

## 2021-07-30 PROCEDURE — 80048 BASIC METABOLIC PNL TOTAL CA: CPT | Performed by: INTERNAL MEDICINE

## 2021-07-30 PROCEDURE — 84550 ASSAY OF BLOOD/URIC ACID: CPT | Performed by: INTERNAL MEDICINE

## 2021-07-30 PROCEDURE — 99214 OFFICE O/P EST MOD 30 MIN: CPT | Mod: 25 | Performed by: INTERNAL MEDICINE

## 2021-07-30 PROCEDURE — 17110 DESTRUCTION B9 LES UP TO 14: CPT | Performed by: INTERNAL MEDICINE

## 2021-07-30 PROCEDURE — 99396 PREV VISIT EST AGE 40-64: CPT | Mod: 25 | Performed by: INTERNAL MEDICINE

## 2021-07-30 PROCEDURE — 84460 ALANINE AMINO (ALT) (SGPT): CPT | Performed by: INTERNAL MEDICINE

## 2021-07-30 RX ORDER — LOSARTAN POTASSIUM 50 MG/1
50 TABLET ORAL DAILY
Qty: 90 TABLET | Refills: 3 | Status: SHIPPED | OUTPATIENT
Start: 2021-07-30 | End: 2022-08-17

## 2021-07-30 RX ORDER — INDOMETHACIN 25 MG/1
25 CAPSULE ORAL
Qty: 42 CAPSULE | Refills: 1 | Status: SHIPPED | OUTPATIENT
Start: 2021-07-30

## 2021-07-30 RX ORDER — PROBENECID 500 MG/1
TABLET, FILM COATED ORAL
Qty: 180 TABLET | Refills: 3 | Status: SHIPPED | OUTPATIENT
Start: 2021-07-30 | End: 2022-08-17

## 2021-07-30 ASSESSMENT — MIFFLIN-ST. JEOR: SCORE: 1672.28

## 2021-07-30 NOTE — PATIENT INSTRUCTIONS
I'd recommend checking with your insurance to see if they cover the new shingles vaccine, Shingrix.  This vaccine is much more effective than the older shingles vaccine.  Some insurances only cover this if you get it at the pharmacy rather than the clinic, so either check on this or just plan to get the vaccine at a pharmacy.       *    Watch the following website for upcoming information about getting a COVID vaccine through [employer]: [email or web address]    Or through the [facility]:  https://[place]/vaccine/find-vaccine  or call [phone]    COVID Vaccine:  --I strongly encourage you to get a COVID vaccine when it is available to you.    --I have received my COVID vaccine  --The COVID vaccine is safe.  Most serious adverse reactions happen within the first few days or weeks.  Your chance of having a serious complication related to COVID is much higher than having a serious adverse reaction to the vaccine.  --Many people will have low grade fevers, general body aches and fatigue for 1-2 days after getting the vaccine.  This is a sign that your immune system is activated - this is a good thing!  --If you have an allergy to Miralax, you should not get the vaccine.    --Stopping a pandemic requires all of us to do our part. Getting vaccinated is another step you can take to help reduce your chance of being exposed to the virus and spreading it to others. Together, the COVID-19 vaccination and following CDC's recommendations to protect yourself and others will offer the best protection from COVID-19. Wear a mask, wash your hands, stay at least 6 feet from others, and remain home if you're sick.  --If you encounter information about the vaccine that you would like further clarification on, please reach out to my team!  Send me a My Chart message or make an appointment with me.

## 2021-07-30 NOTE — PROGRESS NOTES
SUBJECTIVE:   CC: Santino Adame is an 51 year old male who presents for preventative health visit.       Patient has been advised of split billing requirements and indicates understanding: Yes  Healthy Habits:    Getting at least 3 servings of Calcium per day:  NO    Bi-annual eye exam:  NO    Dental care twice a year:  NO    Sleep apnea or symptoms of sleep apnea:  None    Diet:  Regular (no restrictions)    Frequency of exercise:  None    Duration of exercise:  N/A    Taking medications regularly:  Yes    Barriers to taking medications:  None    Medication side effects:  None    PHQ-2 Total Score:      Hypertension Follow-up      Do you check your blood pressure regularly outside of the clinic? No     Are you following a low salt diet? Yes    Are your blood pressures ever more than 140 on the top number (systolic) OR more   than 90 on the bottom number (diastolic), for example 140/90? unnknow    Gout- no flares while taking medication    Today's PHQ-2 Score:   PHQ-2 ( 1999 Pfizer) 7/30/2021   Q1: Little interest or pleasure in doing things 0   Q2: Feeling down, depressed or hopeless 0   PHQ-2 Score 0       Abuse: Current or Past(Physical, Sexual or Emotional)- No  Do you feel safe in your environment? Yes    Have you ever done Advance Care Planning? (For example, a Health Directive, POLST, or a discussion with a medical provider or your loved ones about your wishes): No, advance care planning information given to patient to review.  Patient plans to discuss their wishes with loved ones or provider.      Social History     Tobacco Use     Smoking status: Former Smoker     Packs/day: 0.50     Types: Cigarettes, Dip, chew, snus or snuff     Smokeless tobacco: Current User     Types: Chew, Snuff     Tobacco comment: 1 pack every 3 days   Substance Use Topics     Alcohol use: Yes     Comment: daily- a few beers     If you drink alcohol do you typically have >3 drinks per day or >7 drinks per week? Yes      Alcohol Use  7/30/2021   Prescreen: >3 drinks/day or >7 drinks/week? -   AUDIT SCORE  10     AUDIT - Alcohol Use Disorders Identification Test - Reproduced from the World Health Organization Audit 2001 (Second Edition) 7/30/2021   1.  How often do you have a drink containing alcohol? 4 or more times a week   2.  How many drinks containing alcohol do you have on a typical day when you are drinking? 3 or 4   3.  How often do you have five or more drinks on one occasion? Weekly   4.  How often during the last year have you found that you were not able to stop drinking once you had started? Never   5.  How often during the last year have you failed to do what was normally expected of you because of drinking? Never   6.  How often during the last year have you needed a first drink in the morning to get yourself going after a heavy drinking session? Never   7.  How often during the last year have you had a feeling of guilt or remorse after drinking? Never   8.  How often during the last year have you been unable to remember what happened the night before because of your drinking? Never   9.  Have you or someone else been injured because of your drinking? Yes, but not in the last year   10. Has a relative, friend, doctor or other health care worker been concerned about your drinking or suggested you cut down? No   TOTAL SCORE 10     WART(S)  Onset: 1 year     Description:   Location: outside of the ball of his right foot  Number of warts: 1  Painful: YES    Accompanying Signs & Symptoms:  Signs of infection: no     History:   History of trauma: no  Prior warts: no     Therapies Tried and outcome: compound w-got smaller but returned      Last PSA: No results found for: PSA    Reviewed orders with patient. Reviewed health maintenance and updated orders accordingly - Yes      Reviewed and updated as needed this visit by clinical staff  Tobacco  Allergies  Meds   Med Hx  Surg Hx  Fam Hx  Soc Hx        Reviewed and updated as needed  "this visit by Provider                    Review of Systems    10 point ROS of systems including Constitutional, Eyes, Respiratory, Cardiovascular, Gastroenterology, Genitourinary, Integumentary, Muscularskeletal, Psychiatric were all negative except for pertinent positives noted in my HPI plus stable bowel issues    OBJECTIVE:   /82   Pulse 84   Temp 98  F (36.7  C) (Tympanic)   Resp 16   Ht 1.803 m (5' 11\")   Wt 80 kg (176 lb 6.4 oz)   SpO2 98%   BMI 24.60 kg/m      Physical Exam  GENERAL: healthy, alert and no distress  EYES: Eyes grossly normal to inspection, PERRL and conjunctivae and sclerae normal  HENT: ear canals and TM's normal, nose and mouth without ulcers or lesions  NECK: no adenopathy, no asymmetry, masses, or scars and thyroid normal to palpation  RESP: lungs clear to auscultation - no rales, rhonchi or wheezes  CV: regular rate and rhythm, normal S1 S2, no S3 or S4, no murmur, click or rub, lower leg edema  ABDOMEN: soft, nontender, no hepatosplenomegaly, no masses and bowel sounds normal  MS: no gross musculoskeletal defects noted, no edema  SKIN: peeling and indurated area on ball of right foot  NEURO: Normal strength and tone, mentation intact and speech normal  PSYCH: mentation appears normal, affect normal/bright    Diagnostic Test Results:  Labs reviewed in Epic  Results for orders placed or performed in visit on 07/30/21 (from the past 24 hour(s))   Uric acid   Result Value Ref Range    Uric Acid 6.0 3.5 - 7.2 mg/dL   Basic metabolic panel  (Ca, Cl, CO2, Creat, Gluc, K, Na, BUN)   Result Value Ref Range    Sodium 142 133 - 144 mmol/L    Potassium 3.2 (L) 3.4 - 5.3 mmol/L    Chloride 108 94 - 109 mmol/L    Carbon Dioxide (CO2) 25 20 - 32 mmol/L    Anion Gap 9 3 - 14 mmol/L    Urea Nitrogen 8 7 - 30 mg/dL    Creatinine 0.88 0.66 - 1.25 mg/dL    Calcium 7.7 (L) 8.5 - 10.1 mg/dL    Glucose 85 70 - 99 mg/dL    GFR Estimate >90 >60 mL/min/1.73m2   ALT   Result Value Ref Range    ALT 20 " 0 - 70 U/L   CBC with platelets   Result Value Ref Range    WBC Count 8.0 4.0 - 11.0 10e3/uL    RBC Count 3.87 (L) 4.40 - 5.90 10e6/uL    Hemoglobin 13.0 (L) 13.3 - 17.7 g/dL    Hematocrit 38.6 (L) 40.0 - 53.0 %     78 - 100 fL    MCH 33.6 (H) 26.5 - 33.0 pg    MCHC 33.7 31.5 - 36.5 g/dL    RDW 11.7 10.0 - 15.0 %    Platelet Count 244 150 - 450 10e3/uL       ASSESSMENT/PLAN:   1. Routine health maintenance      Immunizations: recommended COVID and Shingrix vaccines    Lab Studies: as below.  Decline Hep C screening, low risk.     Colonoscopy/FIT: FIT ordered    Advanced care planning: material provided       2. Benign essential hypertension    BP okay, continue med    - losartan (COZAAR) 50 MG tablet; Take 1 tablet (50 mg) by mouth daily  Dispense: 90 tablet; Refill: 3    3. 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 tophus    No flares in past year, continue probenecid.  Indomethacin placed on file at pharmacy if needed.     - indomethacin (INDOCIN) 25 MG capsule; Take 1 capsule (25 mg) by mouth 3 times daily (with meals)  Dispense: 42 capsule; Refill: 1  - probenecid (BENEMID) 500 MG tablet; TAKE ONE TABLET BY MOUTH TWICE A DAY.  Dispense: 180 tablet; Refill: 3  - Uric acid; Future  - Uric acid    4. Special screening for malignant neoplasms, colon    - Fecal colorectal cancer screen (FIT); Future    5. Encounter for therapeutic drug monitoring    - Uric acid; Future  - Basic metabolic panel  (Ca, Cl, CO2, Creat, Gluc, K, Na, BUN); Future  - ALT; Future  - CBC with platelets; Future      6. Plantar wart    Risks/benefits of liquid nitrogen treatment discussed, and he wished to proceed with treatment.  3 rounds of 5-10 seconds each applied to the lesion using plastic guard to protect the surrounding tissue.  Home care discussed.  Follow-up as needed in 3 weeks if not resolved.      - Treat Benign Wart or Mulloscum Contagiosum or Milia up to 14 lesions (No quantity required)    7.  "Hypokalemia    K a bit low on lab work today, not on diuretic.  Recommend increasing dietary K and recheck in a few weeks.     - Potassium; Future    COUNSELING:   Reviewed preventive health counseling, as reflected in patient instructions    Estimated body mass index is 24.6 kg/m  as calculated from the following:    Height as of this encounter: 1.803 m (5' 11\").    Weight as of this encounter: 80 kg (176 lb 6.4 oz).       He reports that he has quit smoking. His smoking use included cigarettes and dip, chew, snus or snuff. He smoked 0.50 packs per day. His smokeless tobacco use includes chew and snuff.      Counseling Resources:  ATP IV Guidelines  Pooled Cohorts Equation Calculator  FRAX Risk Assessment  ICSI Preventive Guidelines  Dietary Guidelines for Americans, 2010  USDA's MyPlate  ASA Prophylaxis  Lung CA Screening    Naren Sullivan MD  [facility]  "

## 2021-07-30 NOTE — LETTER
"August 2, 2021      Santino Adame  [address] [address] [facility] [address]        Dear ,        We are writing to inform you of your test results.    \"Please let Santino know that his continues to have borderline anemia, possibly a side effect of his medication, but this is stable from last year.       Potassium level is a bit low.  Recommend increasing intake of high potassium foods (banana, apricot, artichoke, cantaloupe, broccoli, brussel sprouts, carrots, beets and beet greens, orange juice sweet potato, baked potato, tomatos, yogurt, squash) and recheck lab in a few weeks.     Other labs are good.\"      Resulted Orders   Uric acid   Result Value Ref Range    Uric Acid 6.0 3.5 - 7.2 mg/dL   Basic metabolic panel  (Ca, Cl, CO2, Creat, Gluc, K, Na, BUN)   Result Value Ref Range    Sodium 142 133 - 144 mmol/L    Potassium 3.2 (L) 3.4 - 5.3 mmol/L    Chloride 108 94 - 109 mmol/L    Carbon Dioxide (CO2) 25 20 - 32 mmol/L    Anion Gap 9 3 - 14 mmol/L    Urea Nitrogen 8 7 - 30 mg/dL    Creatinine 0.88 0.66 - 1.25 mg/dL    Calcium 7.7 (L) 8.5 - 10.1 mg/dL    Glucose 85 70 - 99 mg/dL    GFR Estimate >90 >60 mL/min/1.73m2      Comment:      As of July 11, 2021, eGFR is calculated by the CKD-EPI creatinine equation, without race adjustment. eGFR can be influenced by muscle mass, exercise, and diet. The reported eGFR is an estimation only and is only applicable if the renal function is stable.   ALT   Result Value Ref Range    ALT 20 0 - 70 U/L   CBC with platelets   Result Value Ref Range    WBC Count 8.0 4.0 - 11.0 10e3/uL    RBC Count 3.87 (L) 4.40 - 5.90 10e6/uL    Hemoglobin 13.0 (L) 13.3 - 17.7 g/dL    Hematocrit 38.6 (L) 40.0 - 53.0 %     78 - 100 fL    MCH 33.6 (H) 26.5 - 33.0 pg    MCHC 33.7 31.5 - 36.5 g/dL    RDW 11.7 10.0 - 15.0 %    Platelet Count 244 150 - 450 10e3/uL       If you have any questions or concerns, please call the clinic at the number listed above.       Sincerely,      Naren Coyle" MD Nate/Imelda ALEJANDRO CMA

## 2021-10-22 ENCOUNTER — LAB (OUTPATIENT)
Dept: LAB | Facility: CLINIC | Age: 51
End: 2021-10-22
Payer: COMMERCIAL

## 2021-10-22 DIAGNOSIS — Z12.11 SPECIAL SCREENING FOR MALIGNANT NEOPLASMS, COLON: ICD-10-CM

## 2021-10-22 PROCEDURE — 82274 ASSAY TEST FOR BLOOD FECAL: CPT

## 2021-10-23 LAB — HEMOCCULT STL QL IA: NEGATIVE

## 2022-06-27 NOTE — TELEPHONE ENCOUNTER
90 day refill sent.  Please advise him to schedule virtual visit for med follow-up.    Thanks,  Naren Sullivan MD   
Routing refill request to provider for review/approval because:  Patient needs to be seen because:  Due for lab and visit.  1 year ago (5/24/2019)    losartan (COZAAR) 50 MG tablet    Take 1 tablet (50 mg) by mouth daily    Dispense: 90 tablet     Refills: 3     Start: 5/24/2019      By: Naren Sullivan MD       Last office visit: 5/24/2019 with prescribing provider:  Nate   Future Office Visit:  None.     KIMBER GuzmanN, RN                  
Sent reminder letter.      KIMBER GuzmanN, RN    
No

## 2022-08-16 DIAGNOSIS — I10 BENIGN ESSENTIAL HYPERTENSION: ICD-10-CM

## 2022-08-16 DIAGNOSIS — M1A.0720 CHRONIC IDIOPATHIC GOUT INVOLVING TOE OF LEFT FOOT WITHOUT TOPHUS: ICD-10-CM

## 2022-08-17 RX ORDER — PROBENECID 500 MG/1
TABLET, FILM COATED ORAL
Qty: 180 TABLET | Refills: 0 | Status: SHIPPED | OUTPATIENT
Start: 2022-08-17 | End: 2022-09-22

## 2022-08-17 RX ORDER — LOSARTAN POTASSIUM 50 MG/1
50 TABLET ORAL DAILY
Qty: 90 TABLET | Refills: 0 | Status: SHIPPED | OUTPATIENT
Start: 2022-08-17 | End: 2022-09-22

## 2022-08-17 NOTE — TELEPHONE ENCOUNTER
LM on patient VM refill approved and sent to pharmacy, will need to est care with a new provider for refills. Rachel Cohen on 8/17/2022 at 11:24 AM

## 2022-09-22 ENCOUNTER — OFFICE VISIT (OUTPATIENT)
Dept: FAMILY MEDICINE | Facility: CLINIC | Age: 52
End: 2022-09-22
Payer: COMMERCIAL

## 2022-09-22 VITALS
TEMPERATURE: 97.2 F | HEART RATE: 72 BPM | SYSTOLIC BLOOD PRESSURE: 134 MMHG | BODY MASS INDEX: 25.59 KG/M2 | DIASTOLIC BLOOD PRESSURE: 74 MMHG | OXYGEN SATURATION: 98 % | RESPIRATION RATE: 16 BRPM | HEIGHT: 71 IN | WEIGHT: 182.8 LBS

## 2022-09-22 DIAGNOSIS — M1A.0720 CHRONIC IDIOPATHIC GOUT INVOLVING TOE OF LEFT FOOT WITHOUT TOPHUS: ICD-10-CM

## 2022-09-22 DIAGNOSIS — Z12.11 SPECIAL SCREENING FOR MALIGNANT NEOPLASMS, COLON: ICD-10-CM

## 2022-09-22 DIAGNOSIS — Z00.00 ANNUAL PHYSICAL EXAM: Primary | ICD-10-CM

## 2022-09-22 DIAGNOSIS — I10 BENIGN ESSENTIAL HYPERTENSION: ICD-10-CM

## 2022-09-22 LAB
ANION GAP SERPL CALCULATED.3IONS-SCNC: 14 MMOL/L (ref 7–15)
BUN SERPL-MCNC: 10 MG/DL (ref 6–20)
CALCIUM SERPL-MCNC: 7.2 MG/DL (ref 8.6–10)
CHLORIDE SERPL-SCNC: 106 MMOL/L (ref 98–107)
CHOLEST SERPL-MCNC: 164 MG/DL
CREAT SERPL-MCNC: 1.12 MG/DL (ref 0.67–1.17)
DEPRECATED HCO3 PLAS-SCNC: 24 MMOL/L (ref 22–29)
GFR SERPL CREATININE-BSD FRML MDRD: 79 ML/MIN/1.73M2
GLUCOSE SERPL-MCNC: 99 MG/DL (ref 70–99)
HDLC SERPL-MCNC: 105 MG/DL
LDLC SERPL CALC-MCNC: 37 MG/DL
NONHDLC SERPL-MCNC: 59 MG/DL
POTASSIUM SERPL-SCNC: 3.5 MMOL/L (ref 3.4–5.3)
PSA SERPL-MCNC: 1.21 NG/ML (ref 0–3.5)
SODIUM SERPL-SCNC: 144 MMOL/L (ref 136–145)
TRIGL SERPL-MCNC: 111 MG/DL

## 2022-09-22 PROCEDURE — 99213 OFFICE O/P EST LOW 20 MIN: CPT | Mod: 25 | Performed by: NURSE PRACTITIONER

## 2022-09-22 PROCEDURE — G0103 PSA SCREENING: HCPCS | Performed by: NURSE PRACTITIONER

## 2022-09-22 PROCEDURE — 80048 BASIC METABOLIC PNL TOTAL CA: CPT | Performed by: NURSE PRACTITIONER

## 2022-09-22 PROCEDURE — 82274 ASSAY TEST FOR BLOOD FECAL: CPT | Performed by: NURSE PRACTITIONER

## 2022-09-22 PROCEDURE — 99396 PREV VISIT EST AGE 40-64: CPT | Performed by: NURSE PRACTITIONER

## 2022-09-22 PROCEDURE — 36415 COLL VENOUS BLD VENIPUNCTURE: CPT | Performed by: NURSE PRACTITIONER

## 2022-09-22 PROCEDURE — 80061 LIPID PANEL: CPT | Performed by: NURSE PRACTITIONER

## 2022-09-22 RX ORDER — PROBENECID 500 MG/1
TABLET, FILM COATED ORAL
Qty: 180 TABLET | Refills: 3 | Status: SHIPPED | OUTPATIENT
Start: 2022-09-22 | End: 2023-10-13

## 2022-09-22 RX ORDER — LOSARTAN POTASSIUM 50 MG/1
50 TABLET ORAL DAILY
Qty: 90 TABLET | Refills: 3 | Status: SHIPPED | OUTPATIENT
Start: 2022-09-22 | End: 2023-10-13

## 2022-09-22 ASSESSMENT — PAIN SCALES - GENERAL: PAINLEVEL: NO PAIN (0)

## 2022-09-22 NOTE — LETTER
September 27, 2022      Santino Adame  [address] [address] [address]        Dear ,    We are writing to inform you of your test results.    1. Cholesterol panel normal  2. Kidney function, glucose level normal   3. Calcium level was slightly low, recommend to start over the counter vitamin D/Ca supplement, 2000 units of vit D/600-1200 mg of Calcium.    Resulted Orders   PSA, screen   Result Value Ref Range    Prostate Specific Antigen Screen 1.21 0.00 - 3.50 ng/mL    Narrative    This result is obtained using the Roche Elecsys total PSA method on the narda e601 immunoassay analyzer. Results obtained with different assay methods or kits cannot be used interchangeably.   Lipid panel reflex to direct LDL Fasting   Result Value Ref Range    Cholesterol 164 <200 mg/dL    Triglycerides 111 <150 mg/dL    Direct Measure  >=40 mg/dL    LDL Cholesterol Calculated 37 <=100 mg/dL    Non HDL Cholesterol 59 <130 mg/dL    Narrative    Cholesterol  Desirable:  <200 mg/dL    Triglycerides  Normal:  Less than 150 mg/dL  Borderline High:  150-199 mg/dL  High:  200-499 mg/dL  Very High:  Greater than or equal to 500 mg/dL    Direct Measure HDL  Female:  Greater than or equal to 50 mg/dL   Male:  Greater than or equal to 40 mg/dL    LDL Cholesterol  Desirable:  <100mg/dL  Above Desirable:  100-129 mg/dL   Borderline High:  130-159 mg/dL   High:  160-189 mg/dL   Very High:  >= 190 mg/dL    Non HDL Cholesterol  Desirable:  130 mg/dL  Above Desirable:  130-159 mg/dL  Borderline High:  160-189 mg/dL  High:  190-219 mg/dL  Very High:  Greater than or equal to 220 mg/dL   Basic metabolic panel  (Ca, Cl, CO2, Creat, Gluc, K, Na, BUN)   Result Value Ref Range    Sodium 144 136 - 145 mmol/L    Potassium 3.5 3.4 - 5.3 mmol/L    Chloride 106 98 - 107 mmol/L    Carbon Dioxide (CO2) 24 22 - 29 mmol/L    Anion Gap 14 7 - 15 mmol/L    Urea Nitrogen 10.0 6.0 - 20.0 mg/dL    Creatinine 1.12 0.67 - 1.17 mg/dL    Calcium 7.2 (L)  8.6 - 10.0 mg/dL    Glucose 99 70 - 99 mg/dL    GFR Estimate 79 >60 mL/min/1.73m2      Comment:      Effective December 21, 2021 eGFRcr in adults is calculated using the 2021 CKD-EPI creatinine equation which includes age and gender (Gary et al., NEJM, DOI: 10.1056/YHXKvk6612625)       If you have any questions or concerns, please call the clinic at the number listed above.       Sincerely,      CHARISMA Smalls CNP

## 2022-09-22 NOTE — LETTER
October 13, 2022      Santino Adame  [address] [facility] [address]        Dear ,    We are writing to inform you of your test results.    FIT test is normal, no blood in stool, recommend to continue annual screening.         CHARISMA Perez CNP       Resulted Orders   Fecal colorectal cancer screen (FIT)   Result Value Ref Range    Occult Blood Screen FIT Negative Negative   PSA, screen   Result Value Ref Range    Prostate Specific Antigen Screen 1.21 0.00 - 3.50 ng/mL    Narrative    This result is obtained using the Roche Elecsys total PSA method on the narda e601 immunoassay analyzer. Results obtained with different assay methods or kits cannot be used interchangeably.   Lipid panel reflex to direct LDL Fasting   Result Value Ref Range    Cholesterol 164 <200 mg/dL    Triglycerides 111 <150 mg/dL    Direct Measure  >=40 mg/dL    LDL Cholesterol Calculated 37 <=100 mg/dL    Non HDL Cholesterol 59 <130 mg/dL    Narrative    Cholesterol  Desirable:  <200 mg/dL    Triglycerides  Normal:  Less than 150 mg/dL  Borderline High:  150-199 mg/dL  High:  200-499 mg/dL  Very High:  Greater than or equal to 500 mg/dL    Direct Measure HDL  Female:  Greater than or equal to 50 mg/dL   Male:  Greater than or equal to 40 mg/dL    LDL Cholesterol  Desirable:  <100mg/dL  Above Desirable:  100-129 mg/dL   Borderline High:  130-159 mg/dL   High:  160-189 mg/dL   Very High:  >= 190 mg/dL    Non HDL Cholesterol  Desirable:  130 mg/dL  Above Desirable:  130-159 mg/dL  Borderline High:  160-189 mg/dL  High:  190-219 mg/dL  Very High:  Greater than or equal to 220 mg/dL   Basic metabolic panel  (Ca, Cl, CO2, Creat, Gluc, K, Na, BUN)   Result Value Ref Range    Sodium 144 136 - 145 mmol/L    Potassium 3.5 3.4 - 5.3 mmol/L    Chloride 106 98 - 107 mmol/L    Carbon Dioxide (CO2) 24 22 - 29 mmol/L    Anion Gap 14 7 - 15 mmol/L    Urea Nitrogen 10.0 6.0 - 20.0 mg/dL    Creatinine 1.12 0.67 - 1.17 mg/dL     Calcium 7.2 (L) 8.6 - 10.0 mg/dL    Glucose 99 70 - 99 mg/dL    GFR Estimate 79 >60 mL/min/1.73m2      Comment:      Effective December 21, 2021 eGFRcr in adults is calculated using the 2021 CKD-EPI creatinine equation which includes age and gender (Gary et al., NEJM, DOI: 10.1056/NTLHij4406171)       If you have any questions or concerns, please call the clinic at the number listed above.       Sincerely,      CHARISMA Smalls CNP

## 2022-09-22 NOTE — PROGRESS NOTES
SUBJECTIVE:   CC: Santino is an 52 year old who presents for preventative health visit.       Patient has been advised of split billing requirements and indicates understanding: Yes  Healthy Habits:    Getting at least 3 servings of Calcium per day:  NO    Bi-annual eye exam:  NO    Dental care twice a year:  NO    Sleep apnea or symptoms of sleep apnea:  None    Diet:  Regular (no restrictions)    Frequency of exercise:  None    Duration of exercise:  N/A    Taking medications regularly:  Yes    Barriers to taking medications:  None    Medication side effects:  None    PHQ-2 Total Score:    Additional concerns today:  No    Today's PHQ-2 Score:   PHQ-2 ( 1999 Pfizer) 9/22/2022   Q1: Little interest or pleasure in doing things 0   Q2: Feeling down, depressed or hopeless 0   PHQ-2 Score 0   PHQ-2 Total Score (12-17 Years)- Positive if 3 or more points; Administer PHQ-A if positive -     Abuse: Current or Past(Physical, Sexual or Emotional)- No  Do you feel safe in your environment? Yes    Social History     Tobacco Use     Smoking status: Former Smoker     Packs/day: 0.50     Types: Cigarettes, Dip, chew, snus or snuff     Smokeless tobacco: Current User     Types: Snuff     Tobacco comment: 1 pack every 3 days   Substance Use Topics     Alcohol use: Yes     Comment: daily- a few beers     If you drink alcohol do you typically have >3 drinks per day or >7 drinks per week? no    Last PSA:   Prostate Specific Antigen Screen   Date Value Ref Range Status   09/22/2022 1.21 0.00 - 3.50 ng/mL Final       Reviewed orders with patient. Reviewed health maintenance and updated orders accordingly - Yes  Labs reviewed in EPIC  BP Readings from Last 3 Encounters:   09/22/22 134/74   07/30/21 126/82   04/07/21 (!) 168/90    Wt Readings from Last 3 Encounters:   09/22/22 82.9 kg (182 lb 12.8 oz)   07/30/21 80 kg (176 lb 6.4 oz)   04/07/21 79.4 kg (175 lb)                    Reviewed and updated as needed this visit by clinical  "staff   Tobacco  Allergies  Meds   Med Hx  Surg Hx  Fam Hx  Soc Hx          Reviewed and updated as needed this visit by Provider                       Review of Systems  CONSTITUTIONAL: NEGATIVE for fever, chills, change in weight  INTEGUMENTARY/SKIN: NEGATIVE for worrisome rashes, moles or lesions  EYES: NEGATIVE for vision changes or irritation  ENT: NEGATIVE for ear, mouth and throat problems  RESP: NEGATIVE for significant cough or SOB  CV: NEGATIVE for chest pain, palpitations or peripheral edema  GI: NEGATIVE for nausea, abdominal pain, heartburn, or change in bowel habits   male: negative for dysuria, hematuria, decreased urinary stream, erectile dysfunction, urethral discharge  MUSCULOSKELETAL: NEGATIVE for significant arthralgias or myalgia  NEURO: NEGATIVE for weakness, dizziness or paresthesias  PSYCHIATRIC: NEGATIVE for changes in mood or affect    OBJECTIVE:   /74 (BP Location: Left arm, Patient Position: Sitting, Cuff Size: Adult Large)   Pulse 72   Temp 97.2  F (36.2  C) (Tympanic)   Resp 16   Ht 1.803 m (5' 11\")   Wt 82.9 kg (182 lb 12.8 oz)   SpO2 98%   BMI 25.50 kg/m      Physical Exam  GENERAL: healthy, alert and no distress  EYES: Eyes grossly normal to inspection, PERRL and conjunctivae and sclerae normal  HENT: ear canals and TM's normal, nose and mouth without ulcers or lesions  NECK: no adenopathy, no asymmetry, masses, or scars and thyroid normal to palpation  RESP: lungs clear to auscultation - no rales, rhonchi or wheezes  CV: regular rate and rhythm, normal S1 S2, no S3 or S4, no murmur, click or rub, no peripheral edema and peripheral pulses strong  MS: no gross musculoskeletal defects noted, no edema  SKIN: no suspicious lesions or rashes  NEURO: Normal strength and tone, mentation intact and speech normal  PSYCH: mentation appears normal, affect normal/bright    Diagnostic Test Results:  Labs reviewed in Epic  Results for orders placed or performed in visit on " 09/22/22 (from the past 24 hour(s))   PSA, screen   Result Value Ref Range    Prostate Specific Antigen Screen 1.21 0.00 - 3.50 ng/mL    Narrative    This result is obtained using the Roche Elecsys total PSA method on the narda e601 immunoassay analyzer. Results obtained with different assay methods or kits cannot be used interchangeably.   Lipid panel reflex to direct LDL Fasting   Result Value Ref Range    Cholesterol 164 <200 mg/dL    Triglycerides 111 <150 mg/dL    Direct Measure  >=40 mg/dL    LDL Cholesterol Calculated 37 <=100 mg/dL    Non HDL Cholesterol 59 <130 mg/dL    Narrative    Cholesterol  Desirable:  <200 mg/dL    Triglycerides  Normal:  Less than 150 mg/dL  Borderline High:  150-199 mg/dL  High:  200-499 mg/dL  Very High:  Greater than or equal to 500 mg/dL    Direct Measure HDL  Female:  Greater than or equal to 50 mg/dL   Male:  Greater than or equal to 40 mg/dL    LDL Cholesterol  Desirable:  <100mg/dL  Above Desirable:  100-129 mg/dL   Borderline High:  130-159 mg/dL   High:  160-189 mg/dL   Very High:  >= 190 mg/dL    Non HDL Cholesterol  Desirable:  130 mg/dL  Above Desirable:  130-159 mg/dL  Borderline High:  160-189 mg/dL  High:  190-219 mg/dL  Very High:  Greater than or equal to 220 mg/dL   Basic metabolic panel  (Ca, Cl, CO2, Creat, Gluc, K, Na, BUN)   Result Value Ref Range    Sodium 144 136 - 145 mmol/L    Potassium 3.5 3.4 - 5.3 mmol/L    Chloride 106 98 - 107 mmol/L    Carbon Dioxide (CO2) 24 22 - 29 mmol/L    Anion Gap 14 7 - 15 mmol/L    Urea Nitrogen 10.0 6.0 - 20.0 mg/dL    Creatinine 1.12 0.67 - 1.17 mg/dL    Calcium 7.2 (L) 8.6 - 10.0 mg/dL    Glucose 99 70 - 99 mg/dL    GFR Estimate 79 >60 mL/min/1.73m2       ASSESSMENT/PLAN:   (Z00.00) Annual physical exam  (primary encounter diagnosis)  Comment: exam normal   Plan: PSA, screen, Lipid panel reflex to direct LDL         Fasting, Basic metabolic panel  (Ca, Cl, CO2,         Creat, Gluc, K, Na, BUN)            (Z12.11)  "Special screening for malignant neoplasms, colon  Plan: Fecal colorectal cancer screen (FIT)            (I10) Benign essential hypertension  Comment: well controlled   Plan:continue losartan (COZAAR) 50 MG tablet            (M1A.0720) 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 tophus  Comment: stable, no recent flares, continue Probenecid  Plan: probenecid (BENEMID) 500 MG tablet          COUNSELING:   Reviewed preventive health counseling, as reflected in patient instructions       Regular exercise       Healthy diet/nutrition       Colorectal cancer screening       Prostate cancer screening    Estimated body mass index is 25.5 kg/m  as calculated from the following:    Height as of this encounter: 1.803 m (5' 11\").    Weight as of this encounter: 82.9 kg (182 lb 12.8 oz).         He reports that he has quit smoking. His smoking use included cigarettes and dip, chew, snus or snuff. He smoked 0.50 packs per day. His smokeless tobacco use includes snuff.      Counseling Resources:  ATP IV Guidelines  Pooled Cohorts Equation Calculator  FRAX Risk Assessment  ICSI Preventive Guidelines  Dietary Guidelines for Americans, 2010  USDA's MyPlate  ASA Prophylaxis  Lung CA Screening    CHARISMA Perez CNP  [facility]  "

## 2022-09-23 ENCOUNTER — TELEPHONE (OUTPATIENT)
Dept: FAMILY MEDICINE | Facility: CLINIC | Age: 52
End: 2022-09-23

## 2022-09-23 NOTE — TELEPHONE ENCOUNTER
Patient's call transferred to author  Patient returning call to the [place] Care Team     Patient states he had an annual exam with Terri yesterday   Patient had lab work     Chart reviewed   Noted CMA attempted to notify patient of result note  Relayed result note to patient     please inform patient.  1. Cholesterol panel normal  2. Kidney function, glucose level normal   3. Calcium level was slightly low, recommend to start over the counter vitamin D/Ca supplement, 2000 units of vit D/600-1200 mg of Ca  Elizabeth Osman, APRN CNP    Patient verbalized understanding  No further questions/concerns     Elías Licona RN

## 2022-10-13 LAB — HEMOCCULT STL QL IA: NEGATIVE

## 2023-10-13 ENCOUNTER — OFFICE VISIT (OUTPATIENT)
Dept: FAMILY MEDICINE | Facility: CLINIC | Age: 53
End: 2023-10-13
Payer: COMMERCIAL

## 2023-10-13 VITALS
TEMPERATURE: 98.2 F | HEIGHT: 71 IN | SYSTOLIC BLOOD PRESSURE: 138 MMHG | DIASTOLIC BLOOD PRESSURE: 78 MMHG | WEIGHT: 188.2 LBS | RESPIRATION RATE: 16 BRPM | BODY MASS INDEX: 26.35 KG/M2 | HEART RATE: 72 BPM

## 2023-10-13 DIAGNOSIS — Z12.11 SCREEN FOR COLON CANCER: ICD-10-CM

## 2023-10-13 DIAGNOSIS — Z00.00 ANNUAL PHYSICAL EXAM: Primary | ICD-10-CM

## 2023-10-13 DIAGNOSIS — I10 BENIGN ESSENTIAL HYPERTENSION: ICD-10-CM

## 2023-10-13 DIAGNOSIS — M1A.0720 CHRONIC IDIOPATHIC GOUT INVOLVING TOE OF LEFT FOOT WITHOUT TOPHUS: ICD-10-CM

## 2023-10-13 LAB
ALT SERPL W P-5'-P-CCNC: 15 U/L (ref 0–70)
ANION GAP SERPL CALCULATED.3IONS-SCNC: 14 MMOL/L (ref 7–15)
BUN SERPL-MCNC: 11 MG/DL (ref 6–20)
CALCIUM SERPL-MCNC: 7.6 MG/DL (ref 8.6–10)
CHLORIDE SERPL-SCNC: 102 MMOL/L (ref 98–107)
CREAT SERPL-MCNC: 0.93 MG/DL (ref 0.67–1.17)
DEPRECATED HCO3 PLAS-SCNC: 26 MMOL/L (ref 22–29)
EGFRCR SERPLBLD CKD-EPI 2021: >90 ML/MIN/1.73M2
ERYTHROCYTE [DISTWIDTH] IN BLOOD BY AUTOMATED COUNT: 11.7 % (ref 10–15)
GLUCOSE SERPL-MCNC: 85 MG/DL (ref 70–99)
HCT VFR BLD AUTO: 37.5 % (ref 40–53)
HGB BLD-MCNC: 12.4 G/DL (ref 13.3–17.7)
MCH RBC QN AUTO: 33 PG (ref 26.5–33)
MCHC RBC AUTO-ENTMCNC: 33.1 G/DL (ref 31.5–36.5)
MCV RBC AUTO: 100 FL (ref 78–100)
PLATELET # BLD AUTO: 245 10E3/UL (ref 150–450)
POTASSIUM SERPL-SCNC: 3.4 MMOL/L (ref 3.4–5.3)
PSA SERPL DL<=0.01 NG/ML-MCNC: 1.47 NG/ML (ref 0–3.5)
RBC # BLD AUTO: 3.76 10E6/UL (ref 4.4–5.9)
SODIUM SERPL-SCNC: 142 MMOL/L (ref 135–145)
URATE SERPL-MCNC: 5.2 MG/DL (ref 3.4–7)
WBC # BLD AUTO: 8.3 10E3/UL (ref 4–11)

## 2023-10-13 PROCEDURE — G0103 PSA SCREENING: HCPCS | Performed by: NURSE PRACTITIONER

## 2023-10-13 PROCEDURE — 99396 PREV VISIT EST AGE 40-64: CPT | Performed by: NURSE PRACTITIONER

## 2023-10-13 PROCEDURE — 84460 ALANINE AMINO (ALT) (SGPT): CPT | Performed by: NURSE PRACTITIONER

## 2023-10-13 PROCEDURE — 80048 BASIC METABOLIC PNL TOTAL CA: CPT | Performed by: NURSE PRACTITIONER

## 2023-10-13 PROCEDURE — 84550 ASSAY OF BLOOD/URIC ACID: CPT | Performed by: NURSE PRACTITIONER

## 2023-10-13 PROCEDURE — 99214 OFFICE O/P EST MOD 30 MIN: CPT | Mod: 25 | Performed by: NURSE PRACTITIONER

## 2023-10-13 PROCEDURE — 85027 COMPLETE CBC AUTOMATED: CPT | Performed by: NURSE PRACTITIONER

## 2023-10-13 PROCEDURE — 36415 COLL VENOUS BLD VENIPUNCTURE: CPT | Performed by: NURSE PRACTITIONER

## 2023-10-13 RX ORDER — PROBENECID 500 MG/1
TABLET, FILM COATED ORAL
Qty: 200 TABLET | Refills: 3 | Status: SHIPPED | OUTPATIENT
Start: 2023-10-13

## 2023-10-13 RX ORDER — LOSARTAN POTASSIUM 50 MG/1
50 TABLET ORAL DAILY
Qty: 100 TABLET | Refills: 3 | Status: SHIPPED | OUTPATIENT
Start: 2023-10-13

## 2023-10-13 ASSESSMENT — PAIN SCALES - GENERAL: PAINLEVEL: MODERATE PAIN (4)

## 2023-10-13 NOTE — PROGRESS NOTES
SUBJECTIVE:   CC: Santino is an 53 year old who presents for preventative health visit.       10/13/2023    12:46 PM   Additional Questions   Roomed by Clarice HALL   Accompanied by self         10/13/2023    12:46 PM   Patient Reported Additional Medications   Patient reports taking the following new medications none     Healthy Habits:     Getting at least 3 servings of Calcium per day:  NO    Bi-annual eye exam:  NO    Dental care twice a year:  NO    Sleep apnea or symptoms of sleep apnea:  None    Diet:  Low salt    Frequency of exercise:  None    Duration of exercise:  N/A    Taking medications regularly:  Yes    Barriers to taking medications:  None    Medication side effects:  None    Additional concerns today:  No    Hypertension Follow-up    Do you check your blood pressure regularly outside of the clinic? Checks occasionally   Are you following a low salt diet? Yes  Are your blood pressures ever more than 140 on the top number (systolic) OR more   than 90 on the bottom number (diastolic), for example 140/90? No      Social History     Tobacco Use    Smoking status: Former     Packs/day: .5     Types: Cigarettes, Dip, chew, snus or snuff    Smokeless tobacco: Current     Types: Snuff    Tobacco comments:     1 pack every 3 days   Substance Use Topics    Alcohol use: Yes     Comment: daily- a few beers           10/13/2023    12:49 PM   Alcohol Use   AUDIT SCORE  9     Last PSA:   Prostate Specific Antigen Screen   Date Value Ref Range Status   09/22/2022 1.21 0.00 - 3.50 ng/mL Final       Reviewed orders with patient. Reviewed health maintenance and updated orders accordingly - Yes  Lab work is in process  Labs reviewed in EPIC  BP Readings from Last 3 Encounters:   10/13/23 138/78   09/22/22 134/74   07/30/21 126/82    Wt Readings from Last 3 Encounters:   10/13/23 85.4 kg (188 lb 3.2 oz)   09/22/22 82.9 kg (182 lb 12.8 oz)   07/30/21 80 kg (176 lb 6.4 oz)                    Reviewed and updated as needed this  "visit by clinical staff   Tobacco  Allergies  Meds              Reviewed and updated as needed this visit by Provider                     Review of Systems  CONSTITUTIONAL: NEGATIVE for fever, chills, change in weight  INTEGUMENTARY/SKIN: NEGATIVE for worrisome rashes, moles or lesions  EYES: NEGATIVE for vision changes or irritation  ENT: NEGATIVE for ear, mouth and throat problems  RESP: NEGATIVE for significant cough or SOB  CV: NEGATIVE for chest pain, palpitations or peripheral edema  GI: NEGATIVE for nausea, abdominal pain, heartburn, or change in bowel habits   male: negative for dysuria, hematuria, decreased urinary stream, erectile dysfunction, urethral discharge  MUSCULOSKELETAL: NEGATIVE for significant arthralgias or myalgia  NEURO: NEGATIVE for weakness, dizziness or paresthesias  PSYCHIATRIC: NEGATIVE for changes in mood or affect    OBJECTIVE:   /78 (BP Location: Left arm, Patient Position: Sitting, Cuff Size: Adult Regular)   Pulse 72   Temp 98.2  F (36.8  C) (Tympanic)   Resp 16   Ht 1.791 m (5' 10.5\")   Wt 85.4 kg (188 lb 3.2 oz)   BMI 26.62 kg/m      Physical Exam  GENERAL: healthy, alert and no distress  EYES: Eyes grossly normal to inspection, PERRL and conjunctivae and sclerae normal  HENT: ear canals and TM's normal, nose and mouth without ulcers or lesions  NECK: no adenopathy, no asymmetry, masses, or scars and thyroid normal to palpation  RESP: lungs clear to auscultation - no rales, rhonchi or wheezes  CV: regular rate and rhythm, normal S1 S2, no S3 or S4, no murmur, click or rub, no peripheral edema and peripheral pulses strong  MS: no gross musculoskeletal defects noted, no edema  SKIN: no suspicious lesions or rashes  NEURO: Normal strength and tone, mentation intact and speech normal  PSYCH: mentation appears normal, affect normal/bright    Diagnostic Test Results:  Labs reviewed in Epic    ASSESSMENT/PLAN:   (Z00.00) Annual physical exam  (primary encounter " diagnosis)  Comment: normal exam, labs pending   Plan: ALT, REVIEW OF HEALTH MAINTENANCE PROTOCOL         ORDERS, PSA, screen       (I10) Benign essential hypertension  Comment: well controlled   Plan: BASIC METABOLIC PANEL, continue losartan (COZAAR) 50 MG         tablet            (Z12.11) Screen for colon cancer  Plan: Fecal colorectal cancer screen FIT - Future         (S+30)            (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 His smoking use included cigarettes and dip, chew, snus or snuff. He smoked an average of .5 packs per day. His smokeless tobacco use includes snuff.            CHARISMA Perez CNP  [facility]

## 2023-10-16 ENCOUNTER — TELEPHONE (OUTPATIENT)
Dept: FAMILY MEDICINE | Facility: CLINIC | Age: 53
End: 2023-10-16
Payer: COMMERCIAL

## 2023-10-16 NOTE — TELEPHONE ENCOUNTER
Pt called back for results, lab done 10/13. Read PCP note and recs. Pt had good understanding  Gail Landers on 10/16/2023 at 2:49 PM

## 2023-11-08 ENCOUNTER — LAB (OUTPATIENT)
Dept: LAB | Facility: CLINIC | Age: 53
End: 2023-11-08
Payer: COMMERCIAL

## 2023-11-08 DIAGNOSIS — Z12.11 SCREEN FOR COLON CANCER: ICD-10-CM

## 2023-11-08 LAB — HEMOCCULT STL QL IA: NEGATIVE

## 2023-11-08 PROCEDURE — 82274 ASSAY TEST FOR BLOOD FECAL: CPT
